# Patient Record
Sex: FEMALE | Race: WHITE | NOT HISPANIC OR LATINO | URBAN - METROPOLITAN AREA
[De-identification: names, ages, dates, MRNs, and addresses within clinical notes are randomized per-mention and may not be internally consistent; named-entity substitution may affect disease eponyms.]

---

## 2018-09-30 ENCOUNTER — INPATIENT (INPATIENT)
Facility: HOSPITAL | Age: 83
LOS: 2 days | Discharge: HOME | DRG: 312 | End: 2018-10-03
Attending: HOSPITALIST | Admitting: HOSPITALIST
Payer: MEDICARE

## 2018-09-30 VITALS
DIASTOLIC BLOOD PRESSURE: 83 MMHG | WEIGHT: 125 LBS | SYSTOLIC BLOOD PRESSURE: 142 MMHG | RESPIRATION RATE: 17 BRPM | HEIGHT: 61 IN | OXYGEN SATURATION: 97 % | HEART RATE: 90 BPM | TEMPERATURE: 98 F

## 2018-09-30 DIAGNOSIS — R42 DIZZINESS AND GIDDINESS: ICD-10-CM

## 2018-09-30 DIAGNOSIS — S52.501A UNSPECIFIED FRACTURE OF THE LOWER END OF RIGHT RADIUS, INITIAL ENCOUNTER FOR CLOSED FRACTURE: ICD-10-CM

## 2018-09-30 DIAGNOSIS — Z29.9 ENCOUNTER FOR PROPHYLACTIC MEASURES, UNSPECIFIED: ICD-10-CM

## 2018-09-30 DIAGNOSIS — R55 SYNCOPE AND COLLAPSE: ICD-10-CM

## 2018-09-30 DIAGNOSIS — S02.92XA UNSPECIFIED FRACTURE OF FACIAL BONES, INITIAL ENCOUNTER FOR CLOSED FRACTURE: ICD-10-CM

## 2018-09-30 LAB
ALBUMIN SERPL ELPH-MCNC: 3.3 G/DL — LOW (ref 3.5–5)
ALP SERPL-CCNC: 153 U/L — HIGH (ref 40–120)
ALT FLD-CCNC: 99 U/L DA — HIGH (ref 10–60)
ANION GAP SERPL CALC-SCNC: 7 MMOL/L — SIGNIFICANT CHANGE UP (ref 5–17)
APPEARANCE UR: CLEAR — SIGNIFICANT CHANGE UP
APTT BLD: 26.5 SEC — LOW (ref 27.5–37.4)
AST SERPL-CCNC: 135 U/L — HIGH (ref 10–40)
BACTERIA # UR AUTO: ABNORMAL /HPF
BASOPHILS # BLD AUTO: 0.1 K/UL — SIGNIFICANT CHANGE UP (ref 0–0.2)
BASOPHILS NFR BLD AUTO: 0.8 % — SIGNIFICANT CHANGE UP (ref 0–2)
BILIRUB SERPL-MCNC: 0.6 MG/DL — SIGNIFICANT CHANGE UP (ref 0.2–1.2)
BILIRUB UR-MCNC: NEGATIVE — SIGNIFICANT CHANGE UP
BUN SERPL-MCNC: 27 MG/DL — HIGH (ref 7–18)
CALCIUM SERPL-MCNC: 8.9 MG/DL — SIGNIFICANT CHANGE UP (ref 8.4–10.5)
CHLORIDE SERPL-SCNC: 105 MMOL/L — SIGNIFICANT CHANGE UP (ref 96–108)
CK MB BLD-MCNC: 3.1 % — SIGNIFICANT CHANGE UP (ref 0–3.5)
CK MB CFR SERPL CALC: 8.8 NG/ML — HIGH (ref 0–3.6)
CK SERPL-CCNC: 285 U/L — HIGH (ref 21–215)
CO2 SERPL-SCNC: 26 MMOL/L — SIGNIFICANT CHANGE UP (ref 22–31)
COLOR SPEC: YELLOW — SIGNIFICANT CHANGE UP
CREAT SERPL-MCNC: 0.98 MG/DL — SIGNIFICANT CHANGE UP (ref 0.5–1.3)
DIFF PNL FLD: ABNORMAL
EOSINOPHIL # BLD AUTO: 0 K/UL — SIGNIFICANT CHANGE UP (ref 0–0.5)
EOSINOPHIL NFR BLD AUTO: 0 % — SIGNIFICANT CHANGE UP (ref 0–6)
EPI CELLS # UR: ABNORMAL /HPF
GLUCOSE SERPL-MCNC: 143 MG/DL — HIGH (ref 70–99)
GLUCOSE UR QL: NEGATIVE — SIGNIFICANT CHANGE UP
HCT VFR BLD CALC: 38.1 % — SIGNIFICANT CHANGE UP (ref 34.5–45)
HGB BLD-MCNC: 12.1 G/DL — SIGNIFICANT CHANGE UP (ref 11.5–15.5)
INR BLD: 0.96 RATIO — SIGNIFICANT CHANGE UP (ref 0.88–1.16)
KETONES UR-MCNC: NEGATIVE — SIGNIFICANT CHANGE UP
LEUKOCYTE ESTERASE UR-ACNC: NEGATIVE — SIGNIFICANT CHANGE UP
LYMPHOCYTES # BLD AUTO: 0.5 K/UL — LOW (ref 1–3.3)
LYMPHOCYTES # BLD AUTO: 3.2 % — LOW (ref 13–44)
MCHC RBC-ENTMCNC: 29.1 PG — SIGNIFICANT CHANGE UP (ref 27–34)
MCHC RBC-ENTMCNC: 31.7 GM/DL — LOW (ref 32–36)
MCV RBC AUTO: 91.8 FL — SIGNIFICANT CHANGE UP (ref 80–100)
MONOCYTES # BLD AUTO: 0.7 K/UL — SIGNIFICANT CHANGE UP (ref 0–0.9)
MONOCYTES NFR BLD AUTO: 4.4 % — SIGNIFICANT CHANGE UP (ref 2–14)
NEUTROPHILS # BLD AUTO: 14.7 K/UL — HIGH (ref 1.8–7.4)
NEUTROPHILS NFR BLD AUTO: 91.6 % — HIGH (ref 43–77)
NITRITE UR-MCNC: POSITIVE
PH UR: 6 — SIGNIFICANT CHANGE UP (ref 5–8)
PLATELET # BLD AUTO: 312 K/UL — SIGNIFICANT CHANGE UP (ref 150–400)
POTASSIUM SERPL-MCNC: 4.3 MMOL/L — SIGNIFICANT CHANGE UP (ref 3.5–5.3)
POTASSIUM SERPL-SCNC: 4.3 MMOL/L — SIGNIFICANT CHANGE UP (ref 3.5–5.3)
PROT SERPL-MCNC: 6.9 G/DL — SIGNIFICANT CHANGE UP (ref 6–8.3)
PROT UR-MCNC: 15
PROTHROM AB SERPL-ACNC: 10.5 SEC — SIGNIFICANT CHANGE UP (ref 9.8–12.7)
RBC # BLD: 4.15 M/UL — SIGNIFICANT CHANGE UP (ref 3.8–5.2)
RBC # FLD: 12.9 % — SIGNIFICANT CHANGE UP (ref 10.3–14.5)
RBC CASTS # UR COMP ASSIST: ABNORMAL /HPF (ref 0–2)
SODIUM SERPL-SCNC: 138 MMOL/L — SIGNIFICANT CHANGE UP (ref 135–145)
SP GR SPEC: 1.01 — SIGNIFICANT CHANGE UP (ref 1.01–1.02)
TROPONIN I SERPL-MCNC: <0.015 NG/ML — SIGNIFICANT CHANGE UP (ref 0–0.04)
UROBILINOGEN FLD QL: NEGATIVE — SIGNIFICANT CHANGE UP
WBC # BLD: 16 K/UL — HIGH (ref 3.8–10.5)
WBC # FLD AUTO: 16 K/UL — HIGH (ref 3.8–10.5)
WBC UR QL: SIGNIFICANT CHANGE UP /HPF (ref 0–5)

## 2018-09-30 PROCEDURE — 70450 CT HEAD/BRAIN W/O DYE: CPT | Mod: 26

## 2018-09-30 PROCEDURE — 72125 CT NECK SPINE W/O DYE: CPT | Mod: 26

## 2018-09-30 PROCEDURE — 73130 X-RAY EXAM OF HAND: CPT | Mod: 26,RT

## 2018-09-30 PROCEDURE — 99285 EMERGENCY DEPT VISIT HI MDM: CPT

## 2018-09-30 PROCEDURE — 71045 X-RAY EXAM CHEST 1 VIEW: CPT | Mod: 26

## 2018-09-30 PROCEDURE — 73110 X-RAY EXAM OF WRIST: CPT | Mod: 26,RT

## 2018-09-30 PROCEDURE — 99222 1ST HOSP IP/OBS MODERATE 55: CPT | Mod: AI,GC

## 2018-09-30 PROCEDURE — 70486 CT MAXILLOFACIAL W/O DYE: CPT | Mod: 26

## 2018-09-30 RX ORDER — ACETAMINOPHEN 500 MG
650 TABLET ORAL EVERY 6 HOURS
Qty: 0 | Refills: 0 | Status: DISCONTINUED | OUTPATIENT
Start: 2018-09-30 | End: 2018-10-03

## 2018-09-30 RX ORDER — IBUPROFEN 200 MG
400 TABLET ORAL EVERY 6 HOURS
Qty: 0 | Refills: 0 | Status: DISCONTINUED | OUTPATIENT
Start: 2018-09-30 | End: 2018-10-03

## 2018-09-30 RX ORDER — SODIUM CHLORIDE 9 MG/ML
3 INJECTION INTRAMUSCULAR; INTRAVENOUS; SUBCUTANEOUS ONCE
Qty: 0 | Refills: 0 | Status: COMPLETED | OUTPATIENT
Start: 2018-09-30 | End: 2018-09-30

## 2018-09-30 RX ORDER — ENOXAPARIN SODIUM 100 MG/ML
40 INJECTION SUBCUTANEOUS DAILY
Qty: 0 | Refills: 0 | Status: DISCONTINUED | OUTPATIENT
Start: 2018-09-30 | End: 2018-10-03

## 2018-09-30 RX ADMIN — SODIUM CHLORIDE 3 MILLILITER(S): 9 INJECTION INTRAMUSCULAR; INTRAVENOUS; SUBCUTANEOUS at 08:28

## 2018-09-30 NOTE — H&P ADULT - HISTORY OF PRESENT ILLNESS
89/F form home walks with walker with no significant PMH presents after fall at home. 89/F form home walks with walker with PMH of left eye blindness presents after fall at home. This morning at 5 AM patient woke up to go to bathroom, while her way back she fell, she does not remember what happened, she briefly lost consciousness but denies any confusion or incontinence. She noted bleeding from her nose and her wrist was blue. she was not able to stand up so she crawl to sofa and called family who brought her to hospital. Denies nausea, vomiting, diarrhea, abdominal pain, SOB, chest pain, PADRON, palpitations, headache, cough, wheezing, joint pain or swelling, fever, chills.

## 2018-09-30 NOTE — H&P ADULT - NSHPPHYSICALEXAM_GEN_ALL_CORE
· Constitutional	Well-developed, well nourished  · Eyes	EOMI; PERRL; no drainage or redness  · ENMT	No oral lesions; no gross abnormalities  · Neck	No bruits; no thyromegaly or nodules   · Back	No deformity or limitation of movement   · Respiratory	Breath Sounds equal & clear to percussion & auscultation, no accessory muscle use  · Cardiovascular	Regular rate & rhythm, normal S1, S2; no murmurs, gallops or rubs; no S3, S4  · Gastrointestinal	Soft, non-tender, no hepatosplenomegaly, normal bowel sounds  · Extremities	No cyanosis, clubbing or edema   · Neurological	Alert & oriented; no sensory, motor or coordination deficits, normal reflexes  · Musculoskeletal	No joint pain, swelling or deformity; no limitation of movement · Constitutional	Well-developed, well nourished  · Eyes	periorbital swelling over right eye, left eye blindness   · ENMT	B/l bruise noted over face around cheeks and asymmetric appearance    · Neck	No bruits; no thyromegaly or nodules   · Back	No deformity or limitation of movement   · Respiratory	Breath Sounds equal & clear to percussion & auscultation, no accessory muscle use  · Cardiovascular	Regular rate & rhythm, normal S1, S2; no murmurs, gallops or rubs; no S3, S4  · Gastrointestinal	Soft, non-tender, no hepatosplenomegaly, normal bowel sounds  · Extremities	right wrist splinted, intact distal pulse and no restriction of finger movement   · Neurological	Alert & oriented; no sensory, motor or coordination deficits, normal reflexes  · Musculoskeletal	No joint pain, swelling or deformity; no limitation of movement

## 2018-09-30 NOTE — H&P ADULT - ATTENDING COMMENTS
Patient was examined in the ED with Dr. Mar.  She sustained a fall at home and believes she briefly lost consciousness.  She lives alone, has children and grandchildren.   PMD was Dr. Graham.  She has not seen him recently; she states he no longer takes her insurance.     Alert cooperative 89 yo woman with facial bruises below the orbits and in the maxillary area.  Vital Signs Last 24 Hrs  T(C): 36.7 (30 Sep 2018 19:56), Max: 36.7 (30 Sep 2018 16:12)  T(F): 98 (30 Sep 2018 19:56), Max: 98 (30 Sep 2018 16:12)  HR: 97 (30 Sep 2018 19:56) (90 - 99)  BP: 129/65 (30 Sep 2018 19:56) (129/65 - 142/83)  BP(mean): --  RR: 18 (30 Sep 2018 19:56) (17 - 18)  SpO2: 98% (30 Sep 2018 19:56) (97% - 98%)  Lungs, clear  Cor, no murmur  Neurological, intact                       12.1   16.0  )-----------( 312      ( 30 Sep 2018 08:30 )             38.1   09-30    138  |  105  |  27<H>  ----------------------------<  143<H>  4.3   |  26  |  0.98    Ca    8.9      30 Sep 2018 08:30    TPro  6.9  /  Alb  3.3<L>  /  TBili  0.6  /  DBili  x   /  AST  135<H>  /  ALT  99<H>  /  AlkPhos  153<H>  0930        Urinalysis Basic - ( 30 Sep 2018 11:02 )    Color: Yellow / Appearance: Clear / S.010 / pH: x  Gluc: x / Ketone: Negative  / Bili: Negative / Urobili: Negative   Blood: x / Protein: 15 / Nitrite: Positive   Leuk Esterase: Negative / RBC: 2-5 /HPF / WBC 0-2 /HPF   Sq Epi: x / Non Sq Epi: Moderate /HPF / Bacteria: Many /HPF  PT/INR - ( 30 Sep 2018 08:30 )   PT: 10.5 sec;   INR: 0.96 ratio      PTT - ( 30 Sep 2018 08:30 )  PTT:26.5 sec    Lactate Trend    CARDIAC MARKERS ( 30 Sep 2018 08:30 )  <0.015 ng/mL / x     / 285 U/L / x     / 8.8 ng/mL  POCT Blood Glucose.: 141 mg/dL (30 Sep 2018 07:55)    IMP:  Syncope, resulting in facial trauma.  R/o ACS, arrhythmia.  PE is in the ddx, but less likely.           Patient lives alone in her home and may need some additional help.   Plan: Tele/troponin/echo/cardiology          SW consultation.  ENT has been called by ED for facial trauma.

## 2018-09-30 NOTE — H&P ADULT - PROBLEM SELECTOR PLAN 4
IMPROVE VTE Individual Risk Assessment          RISK                                                          Points  [  ] Previous VTE                                                3  [  ] Thrombophilia                                             2  [  ] Lower limb paralysis                                   2        (unable to hold up >15 seconds)    [  ] Current Cancer                                             2         (within 6 months)  [ x ] Immobilization > 24 hrs                              1  [  ] ICU/CCU stay > 24 hours                             1  [ x ] Age > 60                                                         1    IMPROVE VTE Score: 2  will start on lovenox

## 2018-09-30 NOTE — H&P ADULT - NSHPLABSRESULTS_GEN_ALL_CORE
Vital Signs Last 24 Hrs  T(C): 36.2 (30 Sep 2018 10:35), Max: 36.6 (30 Sep 2018 07:47)  T(F): 97.2 (30 Sep 2018 10:35), Max: 97.8 (30 Sep 2018 07:47)  HR: 94 (30 Sep 2018 10:35) (90 - 94)  BP: 136/73 (30 Sep 2018 10:35) (136/73 - 142/83)  RR: 18 (30 Sep 2018 10:35) (17 - 18)  SpO2: 97% (30 Sep 2018 10:35) (97% - 97%)                          12.1   16.0  )-----------( 312      ( 30 Sep 2018 08:30 )             38.1         138  |  105  |  27<H>  ----------------------------<  143<H>  4.3   |  26  |  0.98    Ca    8.9      30 Sep 2018 08:30    TPro  6.9  /  Alb  3.3<L>  /  TBili  0.6  /  DBili  x   /  AST  135<H>  /  ALT  99<H>  /  AlkPhos  153<H>      Urinalysis Basic - ( 30 Sep 2018 11:02 )    Color: Yellow / Appearance: Clear / S.010 / pH: x  Gluc: x / Ketone: Negative  / Bili: Negative / Urobili: Negative   Blood: x / Protein: 15 / Nitrite: Positive   Leuk Esterase: Negative / RBC: 2-5 /HPF / WBC 0-2 /HPF   Sq Epi: x / Non Sq Epi: Moderate /HPF / Bacteria: Many /HPF    PT/INR - ( 30 Sep 2018 08:30 )   PT: 10.5 sec;   INR: 0.96 ratio         PTT - ( 30 Sep 2018 08:30 )  PTT:26.5 sec    CARDIAC MARKERS ( 30 Sep 2018 08:30 )  <0.015 ng/mL / x     / 285 U/L / x     / 8.8 ng/mL      CAPILLARY BLOOD GLUCOSE    POCT Blood Glucose.: 141 mg/dL (30 Sep 2018 07:55)    < from: Xray Hand 3 Views, Right (18 @ 09:32) >    Impression:    Old injury deformity, however there is suspicion of a new distal radial   head fracture. This would be better evaluated with CT as clinically   indicated.    < from: Xray Chest 1 View AP/PA (18 @ 09:32) >    Comparison: None.     The mediastinal cardiac silhouette is unremarkable.    The lungs are clear.     Severe deformity of the right shoulder only partially imaged. This   appears chronic. Correlate clinically for pain at this site. No definite   acute osseous injury is seen.    < from: Xray Wrist 3 Views, Right (18 @ 09:31) >    Impression:    Old injury deformity, however there is suspicion of a new distal radial   head fracture. This would be better evaluated with CT as clinically   indicated.      < end of copied text >    < from: CT Maxillofacial No Cont (18 @ 09:29) >    IMPRESSION:  CT head: No acute intracranial hemorrhage ormass effect.    CT cervical spine: No CT evidence of cervical spine fracture.    CT facial bones:  -Extensive bilateral facial bone fractures:  -Right orbit superomedially and of the right orbital floor, involving the   right infraorbital foramen foramen  -Left inferior orbital flopr  -Comminuted fractures of nearly every wall of the maxillary sinuses; open   fracture lines with communication between the oral cavity and the   maxillary sinuses are seen bilaterally.  -Mildly displaced fracture of the bony nasal septum posteriorly, there it   appears to be contained within the membranous nasal septum.  -Bilateral nasal bone and bilateral frontal recesses of the maxilla   fractures.

## 2018-09-30 NOTE — ED ADULT NURSE NOTE - NSIMPLEMENTINTERV_GEN_ALL_ED
Implemented All Fall with Harm Risk Interventions:  [place] to call system. Call bell, personal items and telephone within reach. Instruct patient to call for assistance. Room bathroom lighting operational. Non-slip footwear when patient is off stretcher. Physically safe environment: no spills, clutter or unnecessary equipment. Stretcher in lowest position, wheels locked, appropriate side rails in place. Provide visual cue, wrist band, yellow gown, etc. Monitor gait and stability. Monitor for mental status changes and reorient to person, place, and time. Review medications for side effects contributing to fall risk. Reinforce activity limits and safety measures with patient and family. Provide visual clues: red socks.

## 2018-09-30 NOTE — ED ADULT NURSE NOTE - OBJECTIVE STATEMENT
pt trip and fall pt hit the head bruises and swelling in the face . pt has the hematoma to the rt wrist

## 2018-09-30 NOTE — ED PROVIDER NOTE - CARE PLAN
Principal Discharge DX:	Dizziness  Secondary Diagnosis:	Facial fracture  Secondary Diagnosis:	Distal radius fracture, right

## 2018-09-30 NOTE — ED ADULT NURSE NOTE - CHIEF COMPLAINT
Gissel was seen in clinic for a weight check.  Mother also had concerns about the last 3 appt charges. Mother states that they were billed as mental health appt's and are fairly costly. Mother wondering if they could be billed differently?    Wt Readings from Last 5 Encounters:   02/06/17 78 lb 6.4 oz (35.562 kg) (3.94 %*)   01/23/17 79 lb 3.2 oz (35.925 kg) (4.80 %*)   01/16/17 78 lb (35.381 kg) (3.95 %*)   01/02/17 78 lb 6.4 oz (35.562 kg) (4.49 %*)   12/27/16 76 lb (34.473 kg) (2.86 %*)     * Growth percentiles are based on CDC 2-20 Years data.     Routing to Dr. Lebron for review.   Tran Gonzalez RN    
The patient is a 89y Female complaining of fall.

## 2018-09-30 NOTE — ED PROVIDER NOTE - MEDICAL DECISION MAKING DETAILS
89 y/o F pt s/p fall w/ facial and wrist injury, will check CT of face, X-ray of wrist, check labs and reassess

## 2018-09-30 NOTE — ED PROVIDER NOTE - OBJECTIVE STATEMENT
89 y/o F pt w/ no PMHx presents to ED s/p at 5am today. Pt was ambulating with her walker when she lost her balance and fell on to her right hand. Pt crawled to the sofa and called her family who helped her report to ED. Pt is c/o swelling to face and right wrist. Pt denies loss of consciousness, shortness of breath, weakness and all other complaints. Pt lives at home alone, is not currently on any medications. NKDA

## 2018-09-30 NOTE — H&P ADULT - PROBLEM SELECTOR PLAN 1
unfitness fall with brief loss of consciousness  denies any confusion or incontinence after fall   likely vasovagal as right after BM   orthostatic negative   follow ECHO and CD   T1 negative follow T2 and T3   EKG NSR with no STT wave changes unfitness fall with brief loss of consciousness  denies any confusion or incontinence after fall   likely vasovagal as right after BM   orthostatic negative   follow ECHO and CD   T1 negative follow T2 and T3   EKG NSR with no STT wave changes  Cardio Dr. Landeros

## 2018-09-30 NOTE — ED PROVIDER NOTE - LEFT FACE
swelling and periorbital ecchymosis to face, right greater than left, no point tenderness to palpation, no septal hematoma

## 2018-09-30 NOTE — H&P ADULT - PROBLEM SELECTOR PLAN 2
multiple fractures noted over fascial bone and orbital floor, sinuses   - ENT Dr. Bullock was consulted by ED - conservative management.  denies pain at this time   pain control with advil and tylenol

## 2018-09-30 NOTE — H&P ADULT - PROBLEM SELECTOR PROBLEM 4
Dr Pierce  Pt called in. He did an evisit with you on 1/16/18 and was dx'd with influenza. He reported wheezing at the time and is still having intermittent wheezing. Ultimately he is better. He ski 'ed 7 miles the last two days. His energy is up, denies fever, chills or sweats. Could we send an inhaler in for him? He denies h/o asthma.  I zoila'd up inhaler and pharmacy.  Or do you want to see him? He was up for either.    Thanks!     Tere Chacon RN     Need for prophylactic measure

## 2018-09-30 NOTE — H&P ADULT - ASSESSMENT
In ED:   Vitals: 136/73, 94, 97.2, 18(97)  Labs: WBC of 16K   AST/ALT: 135/99 Alkaline phosphate: 153  UA: negative   CT head and cervical spine: no acute pathology needed.  CT facial bone: multiple fractures - ENT Dr. Bullock was consulted - conservative management.   Xray hand: Old injury deformity, however there is suspicion of a new distal radial right head fracture. 89/F form home walks with walker with PMH of left eye blindness presents after fall at home. This morning at 5 AM patient woke up to go to bathroom, while her way back she fell, she does not remember what happened, she briefly lost consciousness but denies any confusion or incontinence. She noted bleeding from her nose and her wrist was blue. she was not able to stand up so she crawl to sofa and called family who brought her to hospital. Denies nausea, vomiting, diarrhea, abdominal pain, SOB, chest pain, PADRON, palpitations, headache, cough, wheezing, joint pain or swelling, fever, chills.     In ED:   Vitals: 136/73, 94, 97.2, 18(97)  Labs: WBC of 16K   AST/ALT: 135/99 Alkaline phosphate: 153  EKG: NSR with no STT wave changes   UA: negative   CT head and cervical spine: no acute pathology needed.  CT facial bone: multiple fractures - ENT Dr. Bullock was consulted - conservative management.   Xray hand: Old injury deformity, however there is suspicion of a new distal radial right head fracture.

## 2018-10-01 DIAGNOSIS — G89.11 ACUTE PAIN DUE TO TRAUMA: ICD-10-CM

## 2018-10-01 LAB
24R-OH-CALCIDIOL SERPL-MCNC: 38.7 NG/ML — SIGNIFICANT CHANGE UP (ref 30–80)
ALBUMIN SERPL ELPH-MCNC: 2.8 G/DL — LOW (ref 3.5–5)
ALP SERPL-CCNC: 145 U/L — HIGH (ref 40–120)
ALT FLD-CCNC: 98 U/L DA — HIGH (ref 10–60)
ANION GAP SERPL CALC-SCNC: 6 MMOL/L — SIGNIFICANT CHANGE UP (ref 5–17)
AST SERPL-CCNC: 88 U/L — HIGH (ref 10–40)
BASOPHILS # BLD AUTO: 0 K/UL — SIGNIFICANT CHANGE UP (ref 0–0.2)
BASOPHILS NFR BLD AUTO: 0.3 % — SIGNIFICANT CHANGE UP (ref 0–2)
BILIRUB SERPL-MCNC: 0.5 MG/DL — SIGNIFICANT CHANGE UP (ref 0.2–1.2)
BUN SERPL-MCNC: 25 MG/DL — HIGH (ref 7–18)
CALCIUM SERPL-MCNC: 9.1 MG/DL — SIGNIFICANT CHANGE UP (ref 8.4–10.5)
CHLORIDE SERPL-SCNC: 104 MMOL/L — SIGNIFICANT CHANGE UP (ref 96–108)
CHOLEST SERPL-MCNC: 134 MG/DL — SIGNIFICANT CHANGE UP (ref 10–199)
CK MB BLD-MCNC: 0.8 % — SIGNIFICANT CHANGE UP (ref 0–3.5)
CK MB CFR SERPL CALC: 3.3 NG/ML — SIGNIFICANT CHANGE UP (ref 0–3.6)
CK SERPL-CCNC: 421 U/L — HIGH (ref 21–215)
CO2 SERPL-SCNC: 29 MMOL/L — SIGNIFICANT CHANGE UP (ref 22–31)
CREAT SERPL-MCNC: 0.82 MG/DL — SIGNIFICANT CHANGE UP (ref 0.5–1.3)
EOSINOPHIL # BLD AUTO: 0 K/UL — SIGNIFICANT CHANGE UP (ref 0–0.5)
EOSINOPHIL NFR BLD AUTO: 0.3 % — SIGNIFICANT CHANGE UP (ref 0–6)
FOLATE SERPL-MCNC: >20 NG/ML — SIGNIFICANT CHANGE UP
GLUCOSE SERPL-MCNC: 95 MG/DL — SIGNIFICANT CHANGE UP (ref 70–99)
HBA1C BLD-MCNC: 5.4 % — SIGNIFICANT CHANGE UP (ref 4–5.6)
HCT VFR BLD CALC: 36.4 % — SIGNIFICANT CHANGE UP (ref 34.5–45)
HDLC SERPL-MCNC: 63 MG/DL — SIGNIFICANT CHANGE UP
HGB BLD-MCNC: 11.7 G/DL — SIGNIFICANT CHANGE UP (ref 11.5–15.5)
LIPID PNL WITH DIRECT LDL SERPL: 59 MG/DL — SIGNIFICANT CHANGE UP
LYMPHOCYTES # BLD AUTO: 0.6 K/UL — LOW (ref 1–3.3)
LYMPHOCYTES # BLD AUTO: 9.6 % — LOW (ref 13–44)
MAGNESIUM SERPL-MCNC: 2.1 MG/DL — SIGNIFICANT CHANGE UP (ref 1.6–2.6)
MCHC RBC-ENTMCNC: 29.1 PG — SIGNIFICANT CHANGE UP (ref 27–34)
MCHC RBC-ENTMCNC: 32.1 GM/DL — SIGNIFICANT CHANGE UP (ref 32–36)
MCV RBC AUTO: 90.7 FL — SIGNIFICANT CHANGE UP (ref 80–100)
MONOCYTES # BLD AUTO: 0.3 K/UL — SIGNIFICANT CHANGE UP (ref 0–0.9)
MONOCYTES NFR BLD AUTO: 4.8 % — SIGNIFICANT CHANGE UP (ref 2–14)
NEUTROPHILS # BLD AUTO: 5.6 K/UL — SIGNIFICANT CHANGE UP (ref 1.8–7.4)
NEUTROPHILS NFR BLD AUTO: 85 % — HIGH (ref 43–77)
PHOSPHATE SERPL-MCNC: 2.8 MG/DL — SIGNIFICANT CHANGE UP (ref 2.5–4.5)
PLATELET # BLD AUTO: 261 K/UL — SIGNIFICANT CHANGE UP (ref 150–400)
POTASSIUM SERPL-MCNC: 4.3 MMOL/L — SIGNIFICANT CHANGE UP (ref 3.5–5.3)
POTASSIUM SERPL-SCNC: 4.3 MMOL/L — SIGNIFICANT CHANGE UP (ref 3.5–5.3)
PROT SERPL-MCNC: 6.5 G/DL — SIGNIFICANT CHANGE UP (ref 6–8.3)
RBC # BLD: 4.02 M/UL — SIGNIFICANT CHANGE UP (ref 3.8–5.2)
RBC # FLD: 12.8 % — SIGNIFICANT CHANGE UP (ref 10.3–14.5)
SODIUM SERPL-SCNC: 139 MMOL/L — SIGNIFICANT CHANGE UP (ref 135–145)
TOTAL CHOLESTEROL/HDL RATIO MEASUREMENT: 2.1 RATIO — LOW (ref 3.3–7.1)
TRIGL SERPL-MCNC: 60 MG/DL — SIGNIFICANT CHANGE UP (ref 10–149)
TROPONIN I SERPL-MCNC: <0.015 NG/ML — SIGNIFICANT CHANGE UP (ref 0–0.04)
TSH SERPL-MCNC: 0.57 UU/ML — SIGNIFICANT CHANGE UP (ref 0.34–4.82)
VIT B12 SERPL-MCNC: 1296 PG/ML — HIGH (ref 232–1245)
WBC # BLD: 6.6 K/UL — SIGNIFICANT CHANGE UP (ref 3.8–10.5)
WBC # FLD AUTO: 6.6 K/UL — SIGNIFICANT CHANGE UP (ref 3.8–10.5)

## 2018-10-01 PROCEDURE — 93880 EXTRACRANIAL BILAT STUDY: CPT | Mod: 26

## 2018-10-01 PROCEDURE — 99233 SBSQ HOSP IP/OBS HIGH 50: CPT | Mod: GC

## 2018-10-01 RX ORDER — HALOPERIDOL DECANOATE 100 MG/ML
1 INJECTION INTRAMUSCULAR ONCE
Qty: 0 | Refills: 0 | Status: COMPLETED | OUTPATIENT
Start: 2018-10-01 | End: 2018-10-01

## 2018-10-01 RX ADMIN — ENOXAPARIN SODIUM 40 MILLIGRAM(S): 100 INJECTION SUBCUTANEOUS at 16:37

## 2018-10-01 RX ADMIN — HALOPERIDOL DECANOATE 1 MILLIGRAM(S): 100 INJECTION INTRAMUSCULAR at 23:03

## 2018-10-01 NOTE — PROGRESS NOTE ADULT - PROBLEM SELECTOR PLAN 2
- Noted with multiple fractures  over fascial bone and orbital floor, sinuses   - Pain control.  - supportive measures

## 2018-10-01 NOTE — CONSULT NOTE ADULT - SUBJECTIVE AND OBJECTIVE BOX
Pt Name: KATIA JERNIGAN  MRN: [id number]    ORTHOPEDIC CONSULT:    Orthopedic diagnosis: Right distal radius fracture    89yFemaleHPI:  89/F form home walks with walker with PMH of left eye blindness presents after fall at home. This morning at 5 AM patient woke up to go to bathroom, while her way back she fell, she does not remember what happened, she briefly lost consciousness but denies any confusion or incontinence. She noted bleeding from her nose and her wrist was blue. she was not able to stand up so she crawl to sofa and called family who brought her to hospital. Denies nausea, vomiting, diarrhea, abdominal pain, SOB, chest pain, PADRON, palpitations, headache, cough, wheezing, joint pain or swelling, fever, chills. (30 Sep 2018 15:41)    89 y/o F from home who presents s/p fall 1 day ago at 5am. Patient states she woke up to use the bathroom, then went into the kitchen and sustained a fall landing onto her right wrist and hitting her head with brief LOC. She states that she crawled to the sofa and called her daughter and was brought to the hospital by EMS. Patient states that she does not have any wrist pain currently. Pt denies Chest pain, SOB, dyspnea, paresthesias, N/V/D, abdominal pain, or pain anywhere else.     AMBULATION: Baseline Ambulation [ X ] With Walker    PAST MEDICAL & SURGICAL HISTORY:  No pertinent past medical history  No significant past surgical history      ALLERGIES: No Known Allergies    MEDICATIONS: acetaminophen   Tablet .. 650 milliGRAM(s) Oral every 6 hours PRN  enoxaparin Injectable 40 milliGRAM(s) SubCutaneous daily  ibuprofen  Tablet. 400 milliGRAM(s) Oral every 6 hours PRN    PHYSICAL EXAM:    Vital Signs Last 24 Hrs  T(C): 36.5 (01 Oct 2018 10:56), Max: 36.8 (30 Sep 2018 23:38)  T(F): 97.7 (01 Oct 2018 10:56), Max: 98.2 (30 Sep 2018 23:38)  HR: 90 (01 Oct 2018 10:56) (78 - 99)  BP: 155/64 (01 Oct 2018 10:56) (129/65 - 155/64)  BP(mean): --  RR: 16 (01 Oct 2018 10:56) (16 - 18)  SpO2: 100% (01 Oct 2018 10:56) (97% - 100%)    Gen: well developed, well nourished, comfortable  Extremities: no clubbing/cyanosis, no edema, no calf tenderness  Neurological: no focal deficits  Skin: no rash on visible skin  Musculoskeletal:    RUE: Skin warm and pink. Splint intact. Full ROM all digits. SILT. NVI. Cap refill <2seconds. Compartments soft.    LABS:                        11.7   6.6   )-----------( 261      ( 01 Oct 2018 05:46 )             36.4     10-01    139  |  104  |  25<H>  ----------------------------<  95  4.3   |  29  |  0.82    Ca    9.1      01 Oct 2018 05:46  Phos  2.8     10-01  Mg     2.1     10-01    TPro  6.5  /  Alb  2.8<L>  /  TBili  0.5  /  DBili  x   /  AST  88<H>  /  ALT  98<H>  /  AlkPhos  145<H>  10-01    PT/INR - ( 30 Sep 2018 08:30 )   PT: 10.5 sec;   INR: 0.96 ratio         PTT - ( 30 Sep 2018 08:30 )  PTT:26.5 sec    RADIOLOGY:   EXAM:  HAND RIGHT (MINIMUM 3 VIEWS)                        EXAM:  WRIST RIGHT (MINIMUM 3 V)                          PROCEDURE DATE:  09/30/2018      INTERPRETATION:  Clinical information: Right hand and wrist pain   following fall.    AP, lateral, and oblique views of the right wrist and right hand were   obtained. There is no comparison examination.    There is diffuse osteopenia which limits evaluation. There are old healed   fractures of the distal right ulnar and radius, however there is a small   step off and lucency at the distal radius which may be acute. There is   associated soft tissue swelling. Correlate clinically for pain at this   site. This would be better evaluated with cross-sectional CT examination.   There are vascular calcifications. There are scattered degenerative   changes.    Impression:    Old injury deformity, however there is suspicion of a new distal radial   head fracture. This would be better evaluated with CT as clinically   indicated.    IMPRESSION: Pt is a  89y Female with right distal radius fracture    PLAN:  -  Recommendation: [ X ] Conservative treatment  --> X-rays reviewed and discussed with Dr. Davis, who recommends conservative management.  -  Patient is to remain in sugar tong splint until she follows up with Dr. Davis as outpatient  -  Pain control  -  DVT prophylaxis  -  Fracture care, NWB to RUE  -  Pt is orthopedically stable   -  Pt is to follow up with Dr. Davis within ONE week in office at: #[phone]  -  Case d/w Dr. Davis Pt Name: KATIA JERNIGAN  MRN: [id number]    ORTHOPEDIC CONSULT:    Orthopedic diagnosis: Right distal radius fracture    89yFemaleHPI:  89/F form home walks with walker with PMH of left eye blindness presents after fall at home. This morning at 5 AM patient woke up to go to bathroom, while her way back she fell, she does not remember what happened, she briefly lost consciousness but denies any confusion or incontinence. She noted bleeding from her nose and her wrist was blue. she was not able to stand up so she crawl to sofa and called family who brought her to hospital. Denies nausea, vomiting, diarrhea, abdominal pain, SOB, chest pain, PADRON, palpitations, headache, cough, wheezing, joint pain or swelling, fever, chills. (30 Sep 2018 15:41)    89 y/o F from home who presents s/p fall 1 day ago at 5am. Patient states she woke up to use the bathroom, then went into the kitchen and sustained a fall landing onto her right wrist and hitting her head. She states that she crawled to the sofa and called her daughter and was brought to the hospital by EMS. Patient states that she does not have any wrist pain currently. Pt denies Chest pain, SOB, dyspnea, paresthesias, N/V/D, abdominal pain, or pain anywhere else.     AMBULATION: Baseline Ambulation [ X ] With Walker    PAST MEDICAL & SURGICAL HISTORY:  No pertinent past medical history  No significant past surgical history      ALLERGIES: No Known Allergies    MEDICATIONS: acetaminophen   Tablet .. 650 milliGRAM(s) Oral every 6 hours PRN  enoxaparin Injectable 40 milliGRAM(s) SubCutaneous daily  ibuprofen  Tablet. 400 milliGRAM(s) Oral every 6 hours PRN    PHYSICAL EXAM:    Vital Signs Last 24 Hrs  T(C): 36.5 (01 Oct 2018 10:56), Max: 36.8 (30 Sep 2018 23:38)  T(F): 97.7 (01 Oct 2018 10:56), Max: 98.2 (30 Sep 2018 23:38)  HR: 90 (01 Oct 2018 10:56) (78 - 99)  BP: 155/64 (01 Oct 2018 10:56) (129/65 - 155/64)  BP(mean): --  RR: 16 (01 Oct 2018 10:56) (16 - 18)  SpO2: 100% (01 Oct 2018 10:56) (97% - 100%)    Gen: well developed, well nourished, comfortable  Extremities: no clubbing/cyanosis, no edema, no calf tenderness  Neurological: no focal deficits  Skin: no rash on visible skin  Musculoskeletal:    RUE: Skin warm and pink. Splint intact. Full ROM all digits. SILT. NVI. Cap refill <2seconds. Compartments soft.    LABS:                        11.7   6.6   )-----------( 261      ( 01 Oct 2018 05:46 )             36.4     10-01    139  |  104  |  25<H>  ----------------------------<  95  4.3   |  29  |  0.82    Ca    9.1      01 Oct 2018 05:46  Phos  2.8     10-01  Mg     2.1     10-01    TPro  6.5  /  Alb  2.8<L>  /  TBili  0.5  /  DBili  x   /  AST  88<H>  /  ALT  98<H>  /  AlkPhos  145<H>  10-01    PT/INR - ( 30 Sep 2018 08:30 )   PT: 10.5 sec;   INR: 0.96 ratio         PTT - ( 30 Sep 2018 08:30 )  PTT:26.5 sec    RADIOLOGY:   EXAM:  HAND RIGHT (MINIMUM 3 VIEWS)                        EXAM:  WRIST RIGHT (MINIMUM 3 V)                          PROCEDURE DATE:  09/30/2018      INTERPRETATION:  Clinical information: Right hand and wrist pain   following fall.    AP, lateral, and oblique views of the right wrist and right hand were   obtained. There is no comparison examination.    There is diffuse osteopenia which limits evaluation. There are old healed   fractures of the distal right ulnar and radius, however there is a small   step off and lucency at the distal radius which may be acute. There is   associated soft tissue swelling. Correlate clinically for pain at this   site. This would be better evaluated with cross-sectional CT examination.   There are vascular calcifications. There are scattered degenerative   changes.    Impression:    Old injury deformity, however there is suspicion of a new distal radial   head fracture. This would be better evaluated with CT as clinically   indicated.    IMPRESSION: Pt is a  89y Female with right distal radius fracture    PLAN:  -  Recommendation: [ X ] Conservative treatment  --> X-rays reviewed and discussed with Dr. Davis, who recommends conservative management.  -  Patient is to remain in sugar tong splint until she follows up with Dr. Davis as outpatient  -  Pain control  -  DVT prophylaxis  -  Fracture care, NWB to RUE  -  Pt is orthopedically stable   -  Pt is to follow up with Dr. Davis within ONE week in office at: #[phone]  -  Case d/w Dr. Davis

## 2018-10-01 NOTE — CONSULT NOTE ADULT - ATTENDING COMMENTS
Thank you for the courtesy of the consultation,I would be available for any further discussion if needed.  Gary Landeros MD,FACC.  [address] [place], [place] [id number] [phone]

## 2018-10-01 NOTE — CONSULT NOTE ADULT - SUBJECTIVE AND OBJECTIVE BOX
Patient is a 89y old  Female who presents with a chief complaint of fall (01 Oct 2018 12:50)  CT showed Extensive bilateral facial bone fractures including right orbit, left inferior orbital floor, fractures of nearly every wall of the maxillary sinuses,   bilateral nasal bone and bilateral frontal recesses of the maxilla   Dr. Bullock ENT recommended conservative management.   Xray distal radial right head fracture. Seen by ortho, sugar tongue splint in place.   No signs of arrhythmia on telemetry monitoring   Pt seen at bedside, NAD, appears comfortable, alert, awake.   Pt denies chest pain, headache, vomiting, right arm/hand numbness, fever.       MEDICATIONS  (STANDING):  enoxaparin Injectable 40 milliGRAM(s) SubCutaneous daily    MEDICATIONS  (PRN):  acetaminophen   Tablet .. 650 milliGRAM(s) Oral every 6 hours PRN Temp greater or equal to 38C (100.4F), Mild Pain (1 - 3)  ibuprofen  Tablet. 400 milliGRAM(s) Oral every 6 hours PRN Moderate Pain (4 - 6)      __________________________________________________  REVIEW OF SYSTEMS:    CONSTITUTIONAL: No fever,   EYES: no acute visual disturbances  NECK: No pain or stiffness  RESPIRATORY: No cough; No shortness of breath  CARDIOVASCULAR: No chest pain, no palpitations  GASTROINTESTINAL: No pain. No nausea or vomiting; No diarrhea   NEUROLOGICAL: No headache or numbness, no tremors  MUSCULOSKELETAL:  + facial soreness, + right forearm discomfort   GENITOURINARY: no dysuria, no frequency, no hesitancy  PSYCHIATRY: no depression , no anxiety  ALL OTHER  ROS negative        Vital Signs Last 24 Hrs  T(C): 36.5 (01 Oct 2018 10:56), Max: 36.8 (30 Sep 2018 23:38)  T(F): 97.7 (01 Oct 2018 10:56), Max: 98.2 (30 Sep 2018 23:38)  HR: 90 (01 Oct 2018 10:56) (78 - 99)  BP: 155/64 (01 Oct 2018 10:56) (129/65 - 155/64)  BP(mean): --  RR: 16 (01 Oct 2018 10:56) (16 - 18)  SpO2: 100% (01 Oct 2018 10:56) (97% - 100%)    ________________________________________________  PHYSICAL EXAM:  GENERAL: NAD  HEENT: Normocephalic;  conjunctivae and sclerae clear; moist mucous membranes;   NECK : supple  CHEST/LUNG: Clear to ausculitation bilaterally with good air entry   HEART: S1 S2  regular; no murmurs, gallops or rubs  ABDOMEN: Soft, Nontender, Nondistended; Bowel sounds present  EXTREMITIES: no cyanosis; no edema; no calf tenderness, + right forearm in splint, intact, +fingers warm to touch, mobile   SKIN: warm and dry; no rash  NERVOUS SYSTEM:  Awake and alert; Oriented  to place, person and time ; no new deficits    _________________________________________________  LABS:                        11.7   6.6   )-----------( 261      ( 01 Oct 2018 05:46 )             36.4     10-    139  |  104  |  25<H>  ----------------------------<  95  4.3   |  29  |  0.82    Ca    9.1      01 Oct 2018 05:46  Phos  2.8     10-  Mg     2.1     10-    TPro  6.5  /  Alb  2.8<L>  /  TBili  0.5  /  DBili  x   /  AST  88<H>  /  ALT  98<H>  /  AlkPhos  145<H>  10-01    PT/INR - ( 30 Sep 2018 08:30 )   PT: 10.5 sec;   INR: 0.96 ratio         PTT - ( 30 Sep 2018 08:30 )  PTT:26.5 sec  Urinalysis Basic - ( 30 Sep 2018 11:02 )    Color: Yellow / Appearance: Clear / S.010 / pH: x  Gluc: x / Ketone: Negative  / Bili: Negative / Urobili: Negative   Blood: x / Protein: 15 / Nitrite: Positive   Leuk Esterase: Negative / RBC: 2-5 /HPF / WBC 0-2 /HPF   Sq Epi: x / Non Sq Epi: Moderate /HPF / Bacteria: Many /HPF      CAPILLARY BLOOD GLUCOSE            RADIOLOGY & ADDITIONAL TESTS:    Imaging Personally Reviewed:  YES/NO    Consultant(s) Notes Reviewed:   YES/ No    Care Discussed with Consultants :     Plan of care was discussed with patient and /or primary care giver; all questions and concerns were addressed and care was aligned with patient's wishes.

## 2018-10-01 NOTE — PROGRESS NOTE ADULT - PROBLEM SELECTOR PLAN 1
likely vasovagal as right after BM, orthostatic negative Troponin negative  no signs of arrythmia on telemetry   - ECHO done pending report   - Cardio Dr. Landeros.

## 2018-10-01 NOTE — PROGRESS NOTE ADULT - ASSESSMENT
89/F form home walks with walker with PMH of left eye blindness presents after fall at home.    Admission EKG: NSR with no acute ST-T wave changes   CT facial bone: multiple fractures --> conservative management.   Xray hand: Old injury deformity, however there is suspicion of a new distal radial right head fracture.

## 2018-10-01 NOTE — PROGRESS NOTE ADULT - PROBLEM SELECTOR PLAN 1
unfitness fall with brief loss of consciousness  denies any confusion or incontinence after fall   likely vasovagal as right after BM   orthostatic negative   follow ECHO and CD   T1 negative follow T2 and T3   EKG NSR with no STT wave changes  Cardio Dr. Landeros

## 2018-10-01 NOTE — PROGRESS NOTE ADULT - SUBJECTIVE AND OBJECTIVE BOX
MEDICAL ATTENDING NOTE- LATE ENTRY NOTE. Patient was seen earlier today    Patient is a 89y old  Female who presents with a chief complaint of fall (01 Oct 2018 13:42)      INTERVAL HPI/OVERNIGHT EVENTS: no new complaints    MEDICATIONS  (STANDING):  enoxaparin Injectable 40 milliGRAM(s) SubCutaneous daily    MEDICATIONS  (PRN):  acetaminophen   Tablet .. 650 milliGRAM(s) Oral every 6 hours PRN Temp greater or equal to 38C (100.4F), Mild Pain (1 - 3)  ibuprofen  Tablet. 400 milliGRAM(s) Oral every 6 hours PRN Moderate Pain (4 - 6)      __________________________________________________  ----------------------------------------------------------------------------------  REVIEW OF SYSTEMS: no fever, no SOB, No Chest pain; feels ok      Vital Signs Last 24 Hrs  T(C): 36.9 (01 Oct 2018 20:22), Max: 37.3 (01 Oct 2018 15:30)  T(F): 98.4 (01 Oct 2018 20:22), Max: 99.1 (01 Oct 2018 15:30)  HR: 95 (01 Oct 2018 20:22) (78 - 108)  BP: 127/79 (01 Oct 2018 20:22) (121/72 - 155/64)  BP(mean): --  RR: 17 (01 Oct 2018 20:22) (16 - 19)  SpO2: 96% (01 Oct 2018 20:22) (96% - 100%)    _________________  PHYSICAL EXAM:  ---------------------------   NAD; Normocephalic;   LUNGS - no wheezing  HEART: S1 S2+   ABDOMEN: Soft, Nontender, non distended; BS+  EXTREMITIES: no cyanosis; no edema  NERVOUS SYSTEM:  Awake and alert; no new deficits  SKIN- Multiple el of ecchymoses on face  MSK: right upper extremity splint    _________________________________________________  LABS:                        11.7   6.6   )-----------( 261      ( 01 Oct 2018 05:46 )             36.4     10    139  |  104  |  25<H>  ----------------------------<  95  4.3   |  29  |  0.82    Ca    9.1      01 Oct 2018 05:46  Phos  2.8     10  Mg     2.1     10-01    TPro  6.5  /  Alb  2.8<L>  /  TBili  0.5  /  DBili  x   /  AST  88<H>  /  ALT  98<H>  /  AlkPhos  145<H>  10    PT/INR - ( 30 Sep 2018 08:30 )   PT: 10.5 sec;   INR: 0.96 ratio         PTT - ( 30 Sep 2018 08:30 )  PTT:26.5 sec  Urinalysis Basic - ( 30 Sep 2018 11:02 )    Color: Yellow / Appearance: Clear / S.010 / pH: x  Gluc: x / Ketone: Negative  / Bili: Negative / Urobili: Negative   Blood: x / Protein: 15 / Nitrite: Positive   Leuk Esterase: Negative / RBC: 2-5 /HPF / WBC 0-2 /HPF   Sq Epi: x / Non Sq Epi: Moderate /HPF / Bacteria: Many /HPF      CAPILLARY BLOOD GLUCOSE                Care Discussed with Consultants :     Plan of care was discussed with patient ; all questions and concerns were addressed and care was aligned with patient's wishes.

## 2018-10-01 NOTE — PHYSICAL THERAPY INITIAL EVALUATION ADULT - WORK/LEISURE ACTIVITY, REHAB EVAL
HISTORY OF PRESENT ILLNESS  Octavio Crook is a 40 y.o. female. HPI ESTABLISHED patient here for painful RIGHT breast cyst. Rates pain 5/10 at this time. Saw Dr. Fran Goldmann in 2016 for symptomatic LEFT breast cysts which she had aspirated at that time. She has noticed the RIGHT breast cyst now for 1 week. Has not had breast imaging since . FH includes 3 maternal aunts with breast cancer, all in their 52's. 1 , 2 living. Review of Systems   All other systems reviewed and are negative. Physical Exam   Pulmonary/Chest: Right breast exhibits mass. Right breast exhibits no inverted nipple, no nipple discharge (3:00 subareolar round mobile mass), no skin change and no tenderness. Left breast exhibits mass (1:00 mobile mass c/w cyst prior imaging). Left breast exhibits no inverted nipple, no nipple discharge, no skin change and no tenderness. Breasts are symmetrical.       Nursing note and vitals reviewed. BREAST ULTRASOUND  Indication: right breast mass 3:00   Technique: The area was scanned using a high-frequency linear-array near-field transducer  Findings: 1.5 x 1.5 cm mass, oval, hypoechoic, through transmission  Impression: Benign cyst  Disposition: Discussed aspiration or observation. Pt has elected for aspiration    ASPIRATION OF BREAST CYST  Indication : CYST:  right  3:00 ra  Ultrasound Findings: see above  Prep : Alcohol. Anesthesia : Lidocaine with epinephrine, 5 cc  Guidance : Ultrasound guidance. Yield :  8 cc clear fluid was aspirated with an 18 gauge needle. Effect : Cyst completely aspirated. Diposition:  Follow up if new mass occurs. Order mammograms, us since she is overdue. ASSESSMENT and PLAN    ICD-10-CM ICD-9-CM    1. Cyst of right breast N60.01 610.0      S/p aspiration of right breast cyst. She is overdue f/u mammograms, us. Will schedule. independent

## 2018-10-01 NOTE — ED ADULT NURSE REASSESSMENT NOTE - COMFORT CARE
side rails up/wait time explained/warm blanket provided/meal provided/assisted to bedpan
warm blanket provided/assisted to bedpan

## 2018-10-01 NOTE — PHYSICAL THERAPY INITIAL EVALUATION ADULT - TRANSFER SAFETY CONCERNS NOTED: SIT/STAND, REHAB EVAL
inability to maintain weight-bearing restrictions w/o assist/decreased safety awareness/losing balance/decreased balance during turns

## 2018-10-01 NOTE — PROGRESS NOTE ADULT - SUBJECTIVE AND OBJECTIVE BOX
NP Note     Patient is a 89y old  Female who presents with a chief complaint of fall (01 Oct 2018 12:50)  CT showed Extensive bilateral facial bone fractures including right orbit, left inferior orbital floor, fractures of nearly every wall of the maxillary sinuses,   bilateral nasal bone and bilateral frontal recesses of the maxilla   Dr. Bullock ENT recommended conservative management.   Xray distal radial right head fracture. Seen by ortho, sugar tongue splint in place.   No signs of arrhythmia on telemetry monitoring   Pt seen at bedside, NAD, appears comfortable, alert, awake.   Pt denies chest pain, headache, vomiting, right arm/hand numbness, fever.       MEDICATIONS  (STANDING):  enoxaparin Injectable 40 milliGRAM(s) SubCutaneous daily    MEDICATIONS  (PRN):  acetaminophen   Tablet .. 650 milliGRAM(s) Oral every 6 hours PRN Temp greater or equal to 38C (100.4F), Mild Pain (1 - 3)  ibuprofen  Tablet. 400 milliGRAM(s) Oral every 6 hours PRN Moderate Pain (4 - 6)      __________________________________________________  REVIEW OF SYSTEMS:    CONSTITUTIONAL: No fever,   EYES: no acute visual disturbances  NECK: No pain or stiffness  RESPIRATORY: No cough; No shortness of breath  CARDIOVASCULAR: No chest pain, no palpitations  GASTROINTESTINAL: No pain. No nausea or vomiting; No diarrhea   NEUROLOGICAL: No headache or numbness, no tremors  MUSCULOSKELETAL:  + facial soreness, + right forearm discomfort   GENITOURINARY: no dysuria, no frequency, no hesitancy  PSYCHIATRY: no depression , no anxiety  ALL OTHER  ROS negative        Vital Signs Last 24 Hrs  T(C): 36.5 (01 Oct 2018 10:56), Max: 36.8 (30 Sep 2018 23:38)  T(F): 97.7 (01 Oct 2018 10:56), Max: 98.2 (30 Sep 2018 23:38)  HR: 90 (01 Oct 2018 10:56) (78 - 99)  BP: 155/64 (01 Oct 2018 10:56) (129/65 - 155/64)  BP(mean): --  RR: 16 (01 Oct 2018 10:56) (16 - 18)  SpO2: 100% (01 Oct 2018 10:56) (97% - 100%)    ________________________________________________  PHYSICAL EXAM:  GENERAL: NAD  HEENT: Normocephalic;  conjunctivae and sclerae clear; moist mucous membranes;   NECK : supple  CHEST/LUNG: Clear to ausculitation bilaterally with good air entry   HEART: S1 S2  regular; no murmurs, gallops or rubs  ABDOMEN: Soft, Nontender, Nondistended; Bowel sounds present  EXTREMITIES: no cyanosis; no edema; no calf tenderness, + right forearm in splint, intact, +fingers warm to touch, mobile   SKIN: warm and dry; no rash  NERVOUS SYSTEM:  Awake and alert; Oriented  to place, person and time ; no new deficits    _________________________________________________  LABS:                        11.7   6.6   )-----------( 261      ( 01 Oct 2018 05:46 )             36.4     10-    139  |  104  |  25<H>  ----------------------------<  95  4.3   |  29  |  0.82    Ca    9.1      01 Oct 2018 05:46  Phos  2.8     10-  Mg     2.1     10-    TPro  6.5  /  Alb  2.8<L>  /  TBili  0.5  /  DBili  x   /  AST  88<H>  /  ALT  98<H>  /  AlkPhos  145<H>  10-    PT/INR - ( 30 Sep 2018 08:30 )   PT: 10.5 sec;   INR: 0.96 ratio         PTT - ( 30 Sep 2018 08:30 )  PTT:26.5 sec  Urinalysis Basic - ( 30 Sep 2018 11:02 )    Color: Yellow / Appearance: Clear / S.010 / pH: x  Gluc: x / Ketone: Negative  / Bili: Negative / Urobili: Negative   Blood: x / Protein: 15 / Nitrite: Positive   Leuk Esterase: Negative / RBC: 2-5 /HPF / WBC 0-2 /HPF   Sq Epi: x / Non Sq Epi: Moderate /HPF / Bacteria: Many /HPF      CAPILLARY BLOOD GLUCOSE            RADIOLOGY & ADDITIONAL TESTS:    Imaging Personally Reviewed:  YES/NO    Consultant(s) Notes Reviewed:   YES/ No    Care Discussed with Consultants :     Plan of care was discussed with patient and /or primary care giver; all questions and concerns were addressed and care was aligned with patient's wishes.

## 2018-10-01 NOTE — CONSULT NOTE ADULT - ATTENDING COMMENTS
89 yo female with right distal radius fracture.  PLAN: non-op treatment.  Keep Sugar tong splint on  F/U in one week.                     Dr. Davis at 8:15 pm 89 yo female with right distal radius fracture.  PLAN: non-op treatment.  Keep Sugar tong splint on  F/U in one week.                     Dr. Davis at 8:15 pm    10/02/2018  Saw pt at bed side.   Pt is A+Ox3  Right wrist in sugar tong splint.   Hand is not swollen and has active ROM and good sensation.  Reviewed xrays right wrist: Minimally angulated DR yanez.  Plan for Nonop treatment.  Called CLARICE BRITO who will come today for changing the splint into a plaster sugartong splint.  If d/c, f/u in office in 10 days. discussed with Primary team Dr. Meza                 ---- Dr. Davis at 4:22 pm

## 2018-10-02 LAB
ANION GAP SERPL CALC-SCNC: 8 MMOL/L — SIGNIFICANT CHANGE UP (ref 5–17)
BUN SERPL-MCNC: 29 MG/DL — HIGH (ref 7–18)
CALCIUM SERPL-MCNC: 9.2 MG/DL — SIGNIFICANT CHANGE UP (ref 8.4–10.5)
CHLORIDE SERPL-SCNC: 104 MMOL/L — SIGNIFICANT CHANGE UP (ref 96–108)
CO2 SERPL-SCNC: 27 MMOL/L — SIGNIFICANT CHANGE UP (ref 22–31)
CREAT SERPL-MCNC: 0.95 MG/DL — SIGNIFICANT CHANGE UP (ref 0.5–1.3)
GLUCOSE SERPL-MCNC: 99 MG/DL — SIGNIFICANT CHANGE UP (ref 70–99)
HCT VFR BLD CALC: 35.5 % — SIGNIFICANT CHANGE UP (ref 34.5–45)
HGB BLD-MCNC: 11.4 G/DL — LOW (ref 11.5–15.5)
MCHC RBC-ENTMCNC: 29 PG — SIGNIFICANT CHANGE UP (ref 27–34)
MCHC RBC-ENTMCNC: 32 GM/DL — SIGNIFICANT CHANGE UP (ref 32–36)
MCV RBC AUTO: 90.6 FL — SIGNIFICANT CHANGE UP (ref 80–100)
PLATELET # BLD AUTO: 280 K/UL — SIGNIFICANT CHANGE UP (ref 150–400)
POTASSIUM SERPL-MCNC: 3.9 MMOL/L — SIGNIFICANT CHANGE UP (ref 3.5–5.3)
POTASSIUM SERPL-SCNC: 3.9 MMOL/L — SIGNIFICANT CHANGE UP (ref 3.5–5.3)
RBC # BLD: 3.92 M/UL — SIGNIFICANT CHANGE UP (ref 3.8–5.2)
RBC # FLD: 12.8 % — SIGNIFICANT CHANGE UP (ref 10.3–14.5)
SODIUM SERPL-SCNC: 139 MMOL/L — SIGNIFICANT CHANGE UP (ref 135–145)
WBC # BLD: 4.5 K/UL — SIGNIFICANT CHANGE UP (ref 3.8–10.5)
WBC # FLD AUTO: 4.5 K/UL — SIGNIFICANT CHANGE UP (ref 3.8–10.5)

## 2018-10-02 RX ORDER — HALOPERIDOL DECANOATE 100 MG/ML
1 INJECTION INTRAMUSCULAR ONCE
Qty: 0 | Refills: 0 | Status: COMPLETED | OUTPATIENT
Start: 2018-10-02 | End: 2018-10-02

## 2018-10-02 RX ADMIN — HALOPERIDOL DECANOATE 1 MILLIGRAM(S): 100 INJECTION INTRAMUSCULAR at 01:37

## 2018-10-02 RX ADMIN — ENOXAPARIN SODIUM 40 MILLIGRAM(S): 100 INJECTION SUBCUTANEOUS at 11:48

## 2018-10-02 NOTE — PROGRESS NOTE ADULT - PROBLEM SELECTOR PLAN 1
Syncope could vasovagal syncope   Troponin x 2 neg  Echo: pef with grade 3 dd. with severe AS,  CT head neg for acute infract   Carotid D: neg for hemodynamically significant stenosis  Less likely neurological cause.

## 2018-10-02 NOTE — PROGRESS NOTE ADULT - PROBLEM SELECTOR PLAN 2
sugar tongue splint in place  - conservative measures   - follow up with ortho as outpatient in 10 days  -Dr. Davis

## 2018-10-02 NOTE — PROGRESS NOTE ADULT - PROBLEM SELECTOR PLAN 2
sugar tongue splint in place  - conservative measures   - follow up with ortho as outpatient sugar tongue splint in place  - conservative measures   - follow up with ortho as outpatient in 10 days  -Dr. Davis

## 2018-10-02 NOTE — ED ADULT NURSE REASSESSMENT NOTE - NS ED NURSE REASSESS COMMENT FT1
Late Entry :Addendum : Pt brought in to [facility], refusing to go inside the room, states the room is too dark.  Pt also states, she spoke to the doctor & was told she is going home tomorrow.  Spoke to Son In-law , Dilip & stated, pt is afraid that when she gets admitted, she will not be able to go home soon. And was told by family, that they will come in tomorrow to visit her.  Pt claimed that if she leaves ED to go upstairs, her family will not be able to find her anymore.  Ms Panda (AND) & Trista (AND) aware. Seen & evaluated by Dr Haney & confirmed that patient has no capacity to refused admission.
received pt awake alert oriented x3 not in distress,with saline lock intact no redness no swelling noted.
Pt denies any distress pain or discomfort telemetry DC awaiting for bed availability Pt care endorsed to Octavio MORALES.
received pt alert and verbally responsive NAD denies pain or discomfort at present safety maintained telemetry in progress box D.

## 2018-10-02 NOTE — PROGRESS NOTE ADULT - SUBJECTIVE AND OBJECTIVE BOX
Saw pt at bed side.   Pt is A+Ox3  Right wrist in sugar tong splint.   Hand is not swollen and has active ROM and good sensation.  Reviewed xrays right wrist: Minimally angulated DR yanez.    Plan for Nonop treatment.  Called CLARICE BRITO who will come today for changing the splint into a plaster sugartong splint.  If d/c, f/u in office in 10 days. discussed with Primary team Dr. Meza                 ---- Dr. Davis at 4:22 pm

## 2018-10-02 NOTE — PROGRESS NOTE ADULT - PROBLEM SELECTOR PLAN 1
vasovagal syncope   Troponin x 2 neg  Echo: Normal LV Systolic Function with grade 3 dd. with severe AS,  CT head neg for acute infract   Carotid D: neg for hemodynamically significant stenosis  Less likely neurological cause.  Patient not candidate for SAVR

## 2018-10-02 NOTE — PROGRESS NOTE ADULT - ATTENDING COMMENTS
Patient was seen and examined by myself. Case was discussed with house staff in details. I have reviewed and agree with the plan as outlined above with edits where appropriate. Patient was seen and examined by myself. Case was discussed with house staff in details. I have reviewed and agree with the plan as outlined above with edits where appropriate.    Clinically stable and improved  - Continue pain control measures  Discussed with ortho  surgeon at bedside;- no acute surgical intervention at this time and recommends; outpatient follow up.

## 2018-10-02 NOTE — PROGRESS NOTE ADULT - SUBJECTIVE AND OBJECTIVE BOX
89/F form home walks with walker with PMH of left eye blindness presents after fall at home. On day of presentation patient woke up to go to bathroom, while her way back she fell, she does not remember what happened, she briefly lost consciousness but denies any confusion or incontinence.     INTERVAL HPI/OVERNIGHT EVENTS: offers no new complaints; current symptoms resolving    MEDICATIONS  (STANDING):  enoxaparin Injectable 40 milliGRAM(s) SubCutaneous daily    MEDICATIONS  (PRN):  acetaminophen   Tablet .. 650 milliGRAM(s) Oral every 6 hours PRN Temp greater or equal to 38C (100.4F), Mild Pain (1 - 3)  ibuprofen  Tablet. 400 milliGRAM(s) Oral every 6 hours PRN Moderate Pain (4 - 6)      __________________________________________________  REVIEW OF SYSTEMS:    CONSTITUTIONAL: No fever,   EYES: no acute visual disturbances  NECK: No pain or stiffness  RESPIRATORY: No cough; No shortness of breath  CARDIOVASCULAR: No chest pain, no palpitations  GASTROINTESTINAL: No pain. No nausea or vomiting; No diarrhea   NEUROLOGICAL: No headache or numbness, no tremors  MUSCULOSKELETAL: No joint pain, no muscle pain  GENITOURINARY: no dysuria, no frequency, no hesitancy  PSYCHIATRY: no depression , no anxiety  ALL OTHER  ROS negative        Vital Signs Last 24 Hrs  T(C): 36.6 (02 Oct 2018 05:30), Max: 37.3 (01 Oct 2018 15:30)  T(F): 97.8 (02 Oct 2018 05:30), Max: 99.1 (01 Oct 2018 15:30)  HR: 82 (02 Oct 2018 05:30) (82 - 108)  BP: 146/64 (02 Oct 2018 05:30) (121/72 - 155/64)  RR: 18 (02 Oct 2018 05:30) (16 - 19)  SpO2: 98% (02 Oct 2018 05:30) (96% - 100%)    ________________________________________________  PHYSICAL EXAM:  GENERAL: NAD  HEENT: bruises present over face,  Normocephalic;  conjunctivae and sclerae clear; moist mucous membranes;   NECK : supple  CHEST/LUNG: Clear to auscultation bilaterally with good air entry   HEART: S1 S2  regular;3/6 Systolic ejection murmur  ABDOMEN: Soft, Nontender, Nondistended; Bowel sounds present  EXTREMITIES: no cyanosis; no edema; no calf tenderness  SKIN: warm and dry; no rash  NERVOUS SYSTEM:  Awake and alert; Oriented  to place, person and time ; no new deficits    _________________________________________________  LABS:                        11.4   4.5   )-----------( 280      ( 02 Oct 2018 05:51 )             35.5     10-    139  |  104  |  29<H>  ----------------------------<  99  3.9   |  27  |  0.95    Ca    9.2      02 Oct 2018 05:51  Phos  2.8     10-  Mg     2.1     10-    TPro  6.5  /  Alb  2.8<L>  /  TBili  0.5  /  DBili  x   /  AST  88<H>  /  ALT  98<H>  /  AlkPhos  145<H>  10-01    PT/INR - ( 30 Sep 2018 08:30 )   PT: 10.5 sec;   INR: 0.96 ratio         PTT - ( 30 Sep 2018 08:30 )  PTT:26.5 sec  Urinalysis Basic - ( 30 Sep 2018 11:02 )    Color: Yellow / Appearance: Clear / S.010 / pH: x  Gluc: x / Ketone: Negative  / Bili: Negative / Urobili: Negative   Blood: x / Protein: 15 / Nitrite: Positive   Leuk Esterase: Negative / RBC: 2-5 /HPF / WBC 0-2 /HPF   Sq Epi: x / Non Sq Epi: Moderate /HPF / Bacteria: Many /HPF      CAPILLARY BLOOD GLUCOSE

## 2018-10-02 NOTE — PROGRESS NOTE ADULT - SUBJECTIVE AND OBJECTIVE BOX
PGY 1 Note discussed with supervising resident and primary attending    Patient is a 89y old  Female who presents with a chief complaint of fall (01 Oct 2018 23:20)      INTERVAL HPI/OVERNIGHT EVENTS: offers no new complaints; current symptoms resolving    MEDICATIONS  (STANDING):  enoxaparin Injectable 40 milliGRAM(s) SubCutaneous daily    MEDICATIONS  (PRN):  acetaminophen   Tablet .. 650 milliGRAM(s) Oral every 6 hours PRN Temp greater or equal to 38C (100.4F), Mild Pain (1 - 3)  ibuprofen  Tablet. 400 milliGRAM(s) Oral every 6 hours PRN Moderate Pain (4 - 6)      __________________________________________________  REVIEW OF SYSTEMS:    CONSTITUTIONAL: No fever,   EYES: no acute visual disturbances  NECK: No pain or stiffness  RESPIRATORY: No cough; No shortness of breath  CARDIOVASCULAR: No chest pain, no palpitations  GASTROINTESTINAL: No pain. No nausea or vomiting; No diarrhea   NEUROLOGICAL: No headache or numbness, no tremors  MUSCULOSKELETAL: No joint pain, no muscle pain  GENITOURINARY: no dysuria, no frequency, no hesitancy  PSYCHIATRY: no depression , no anxiety  ALL OTHER  ROS negative        Vital Signs Last 24 Hrs  T(C): 36.6 (02 Oct 2018 05:30), Max: 37.3 (01 Oct 2018 15:30)  T(F): 97.8 (02 Oct 2018 05:30), Max: 99.1 (01 Oct 2018 15:30)  HR: 82 (02 Oct 2018 05:30) (82 - 108)  BP: 146/64 (02 Oct 2018 05:30) (121/72 - 155/64)  BP(mean): --  RR: 18 (02 Oct 2018 05:30) (16 - 19)  SpO2: 98% (02 Oct 2018 05:30) (96% - 100%)    ________________________________________________  PHYSICAL EXAM:  GENERAL: NAD  HEENT: Normocephalic;  conjunctivae and sclerae clear; moist mucous membranes;   NECK : supple  CHEST/LUNG: Clear to auscultation bilaterally with good air entry   HEART: S1 S2  regular; AS murmur  ABDOMEN: Soft, Nontender, Nondistended; Bowel sounds present  EXTREMITIES: no cyanosis; no edema; no calf tenderness  SKIN: warm and dry; no rash  NERVOUS SYSTEM:  Awake and alert; Oriented  to place, person and time ; no new deficits    _________________________________________________  LABS:                        11.4   4.5   )-----------( 280      ( 02 Oct 2018 05:51 )             35.5     10-02    139  |  104  |  29<H>  ----------------------------<  99  3.9   |  27  |  0.95    Ca    9.2      02 Oct 2018 05:51  Phos  2.8     10-  Mg     2.1     10-    TPro  6.5  /  Alb  2.8<L>  /  TBili  0.5  /  DBili  x   /  AST  88<H>  /  ALT  98<H>  /  AlkPhos  145<H>  10-01    PT/INR - ( 30 Sep 2018 08:30 )   PT: 10.5 sec;   INR: 0.96 ratio         PTT - ( 30 Sep 2018 08:30 )  PTT:26.5 sec  Urinalysis Basic - ( 30 Sep 2018 11:02 )    Color: Yellow / Appearance: Clear / S.010 / pH: x  Gluc: x / Ketone: Negative  / Bili: Negative / Urobili: Negative   Blood: x / Protein: 15 / Nitrite: Positive   Leuk Esterase: Negative / RBC: 2-5 /HPF / WBC 0-2 /HPF   Sq Epi: x / Non Sq Epi: Moderate /HPF / Bacteria: Many /HPF      CAPILLARY BLOOD GLUCOSE            RADIOLOGY & ADDITIONAL TESTS:    Imaging Personally Reviewed:  YES/NO    Consultant(s) Notes Reviewed:   YES/ No    Care Discussed with Consultants :     Plan of care was discussed with patient and /or primary care giver; all questions and concerns were addressed and care was aligned with patient's wishes. PGY 1 Note discussed with supervising resident and primary attending    Patient is a 89y old  Female who presents with a chief complaint of fall (01 Oct 2018 23:20)      INTERVAL HPI/OVERNIGHT EVENTS: offers no new complaints; current symptoms resolving    MEDICATIONS  (STANDING):  enoxaparin Injectable 40 milliGRAM(s) SubCutaneous daily    MEDICATIONS  (PRN):  acetaminophen   Tablet .. 650 milliGRAM(s) Oral every 6 hours PRN Temp greater or equal to 38C (100.4F), Mild Pain (1 - 3)  ibuprofen  Tablet. 400 milliGRAM(s) Oral every 6 hours PRN Moderate Pain (4 - 6)      __________________________________________________  REVIEW OF SYSTEMS:    CONSTITUTIONAL: No fever,   EYES: no acute visual disturbances  NECK: No pain or stiffness  RESPIRATORY: No cough; No shortness of breath  CARDIOVASCULAR: No chest pain, no palpitations  GASTROINTESTINAL: No pain. No nausea or vomiting; No diarrhea   NEUROLOGICAL: No headache or numbness, no tremors  MUSCULOSKELETAL: No joint pain, no muscle pain  GENITOURINARY: no dysuria, no frequency, no hesitancy  PSYCHIATRY: no depression , no anxiety  ALL OTHER  ROS negative        Vital Signs Last 24 Hrs  T(C): 36.6 (02 Oct 2018 05:30), Max: 37.3 (01 Oct 2018 15:30)  T(F): 97.8 (02 Oct 2018 05:30), Max: 99.1 (01 Oct 2018 15:30)  HR: 82 (02 Oct 2018 05:30) (82 - 108)  BP: 146/64 (02 Oct 2018 05:30) (121/72 - 155/64)  BP(mean): --  RR: 18 (02 Oct 2018 05:30) (16 - 19)  SpO2: 98% (02 Oct 2018 05:30) (96% - 100%)    ________________________________________________  PHYSICAL EXAM:  GENERAL: NAD  HEENT: bruises present over face,  Normocephalic;  conjunctivae and sclerae clear; moist mucous membranes;   NECK : supple  CHEST/LUNG: Clear to auscultation bilaterally with good air entry   HEART: S1 S2  regular; AS murmur  ABDOMEN: Soft, Nontender, Nondistended; Bowel sounds present  EXTREMITIES: no cyanosis; no edema; no calf tenderness  SKIN: warm and dry; no rash  NERVOUS SYSTEM:  Awake and alert; Oriented  to place, person and time ; no new deficits    _________________________________________________  LABS:                        11.4   4.5   )-----------( 280      ( 02 Oct 2018 05:51 )             35.5     10-    139  |  104  |  29<H>  ----------------------------<  99  3.9   |  27  |  0.95    Ca    9.2      02 Oct 2018 05:51  Phos  2.8     10-  Mg     2.1     10-    TPro  6.5  /  Alb  2.8<L>  /  TBili  0.5  /  DBili  x   /  AST  88<H>  /  ALT  98<H>  /  AlkPhos  145<H>  10-    PT/INR - ( 30 Sep 2018 08:30 )   PT: 10.5 sec;   INR: 0.96 ratio         PTT - ( 30 Sep 2018 08:30 )  PTT:26.5 sec  Urinalysis Basic - ( 30 Sep 2018 11:02 )    Color: Yellow / Appearance: Clear / S.010 / pH: x  Gluc: x / Ketone: Negative  / Bili: Negative / Urobili: Negative   Blood: x / Protein: 15 / Nitrite: Positive   Leuk Esterase: Negative / RBC: 2-5 /HPF / WBC 0-2 /HPF   Sq Epi: x / Non Sq Epi: Moderate /HPF / Bacteria: Many /HPF      CAPILLARY BLOOD GLUCOSE            RADIOLOGY & ADDITIONAL TESTS:    Imaging Personally Reviewed:  YES    Consultant(s) Notes Reviewed:   YES    Care Discussed with Consultants : Yes    Plan of care was discussed with patient and /or primary care giver; all questions and concerns were addressed and care was aligned with patient's wishes.

## 2018-10-03 VITALS
TEMPERATURE: 98 F | SYSTOLIC BLOOD PRESSURE: 129 MMHG | OXYGEN SATURATION: 98 % | DIASTOLIC BLOOD PRESSURE: 71 MMHG | HEART RATE: 98 BPM | RESPIRATION RATE: 18 BRPM

## 2018-10-03 PROCEDURE — 99233 SBSQ HOSP IP/OBS HIGH 50: CPT

## 2018-10-03 RX ORDER — ACETAMINOPHEN 500 MG
2 TABLET ORAL
Qty: 56 | Refills: 0
Start: 2018-10-03 | End: 2018-10-09

## 2018-10-03 RX ORDER — IBUPROFEN 200 MG
1 TABLET ORAL
Qty: 28 | Refills: 0 | OUTPATIENT
Start: 2018-10-03 | End: 2018-10-09

## 2018-10-03 RX ADMIN — ENOXAPARIN SODIUM 40 MILLIGRAM(S): 100 INJECTION SUBCUTANEOUS at 11:33

## 2018-10-03 NOTE — PROGRESS NOTE ADULT - PROBLEM SELECTOR PLAN 2
-Splint per ortho   - conservative measures   - follow up with ortho as outpatient in 10 days, Dr. Davis

## 2018-10-03 NOTE — PROGRESS NOTE ADULT - PROBLEM SELECTOR PROBLEM 2
Distal radius fracture, right
Facial fracture

## 2018-10-03 NOTE — DISCHARGE NOTE ADULT - HOSPITAL COURSE
89/F form home walks with walker with PMH of left eye blindness presents after fall at home. This morning at 5 AM patient woke up to go to bathroom, while her way back she fell, she does not remember what happened, she briefly lost consciousness admitted to the hospital for syncope work up and for radius.     In ED:   Vitals: 136/73, 94, 97.2, 18(97)  Labs: WBC of 16K   AST/ALT: 135/99 Alkaline phosphate: 153  EKG: NSR with no STT wave changes   UA: negative   CT head and cervical spine: no acute pathology needed.  CT facial bone: multiple fractures - ENT Dr. Bullock was consulted - conservative management.   Xray hand: Old injury deformity, however there is suspicion of a new distal radial right head fracture.      Problem/Plan - 1:  ·  Problem: Syncope.  Plan: unfitness fall with brief loss of consciousness  denies any confusion or incontinence after fall   likely vasovagal as right after BM   orthostatic negative   follow ECHO and CD   T1 negative follow T2 and T3   EKG NSR with no STT wave changes  Cardio Dr. Landeros.     Problem/Plan - 2:  ·  Problem: Facial fracture.  Plan: multiple fractures noted over fascial bone and orbital floor, sinuses   - ENT Dr. Bullock was consulted by ED - conservative management.  denies pain at this time   pain control with advil and tylenol.      Problem/Plan - 3:  ·  Problem: Distal radius fracture, right.  Plan: non displaced fracture   splint was applied by ED   follow up with ortho   pain management. 89/F form home walks with walker with PMH of left eye blindness presents after fall at home. This morning at 5 AM patient woke up to go to bathroom, while her way back she fell, she does not remember what happened, she briefly lost consciousness admitted to the hospital for syncope work up and for radius.     In ED:   Vitals: 136/73, 94, 97.2, 18(97), Labs: WBC of 16K, AST/ALT: 135/99 Alkaline phosphate: 153, EKG: NSR with no STT wave changes, UA: negative   CT head and cervical spine: no acute pathology needed.  CT facial bone: multiple fractures - ENT Dr. Bullock was consulted - conservative management.   Xray hand: Old injury deformity, however there is suspicion of a new distal radial right head fracture.     syncope.  Plan: unfitness fall with brief loss of consciousness, denies any confusion or incontinence after fall, likely vasovagal as right after BM, orthostatic negative, follow ECHO: severe AS with Pef and Grade 3 DD and CD, T1,2 negative, EKG NSR with no STT wave changes  Cardio Dr. Landeros not a candidate for TAVR      Facial fracture.  Plan: multiple fractures noted over fascial bone and orbital floor, sinuses, NT Dr. Bullock was consulted by ED - conservative management, pain control with advil and tylenol.     Distal radius fracture, right.  Plan: non displaced fracture Dr. Davis show the pt and recommended conservative treatment with out pt follow up    Pt was examined bedside and medically stable for discharge to [facility] as per PT. D/w Dr. Meza. 89/F form home walks with walker with PMH of left eye blindness presents after fall at home. This morning at 5 AM patient woke up to go to bathroom, while her way back she fell, she does not remember what happened, she briefly lost consciousness admitted to the hospital for syncope work up and for radius.     In ED:   Vitals: 136/73, 94, 97.2, 18(97), Labs: WBC of 16K, AST/ALT: 135/99 Alkaline phosphate: 153, EKG: NSR with no STT wave changes, UA: negative   CT head and cervical spine: no acute pathology needed.  CT facial bone: multiple fractures - ENT Dr. Bullock was consulted - conservative management.   Xray hand: Old injury deformity, however there is suspicion of a new distal radial right head fracture.     syncope.  Plan: unfitness fall with brief loss of consciousness, denies any confusion or incontinence after fall, likely vasovagal as right after BM, orthostatic negative, follow ECHO: severe AS with Pef and Grade 3 DD and CD, T1,2 negative, EKG NSR with no STT wave changes  Cardio Dr. Landeros not a candidate for TAVR      Facial fracture.  Plan: multiple fractures noted over fascial bone and orbital floor, sinuses, NT Dr. Bullock was consulted by ED - conservative management, pain control with advil and tylenol.     Distal radius fracture, right.  Plan: non displaced fracture - seen in consultation by orthopedic surgeon Dr. Davis who recommended conservative treatment with out pt follow up.    Pt was examined bedside and medically stable for discharge to [facility] as per PT. D/w Dr. Meza.

## 2018-10-03 NOTE — DISCHARGE NOTE ADULT - MEDICATION SUMMARY - MEDICATIONS TO TAKE
I will START or STAY ON the medications listed below when I get home from the hospital:    ibuprofen 400 mg oral tablet  -- 1 tab(s) by mouth every 6 hours, As needed, Moderate Pain (4 - 6)  -- Indication: For Pain    acetaminophen 325 mg oral tablet  -- 2 tab(s) by mouth every 6 hours, As needed, Temp greater or equal to 38C (100.4F), Mild Pain (1 - 3)  -- Indication: For Pain and fever I will START or STAY ON the medications listed below when I get home from the hospital:    acetaminophen 325 mg oral tablet  -- 2 tab(s) by mouth every 6 hours, As needed, Temp greater or equal to 38C (100.4F), Mild Pain (1 - 3)  -- Indication: For Pain and fever

## 2018-10-03 NOTE — DISCHARGE NOTE ADULT - PATIENT PORTAL LINK FT
You can access the [facility] Patient Portal, offered by [facility], by registering with the following website: [email or web address]

## 2018-10-03 NOTE — PROGRESS NOTE ADULT - ATTENDING COMMENTS
Thank you for the courtesy of the consultation,I would be available for any further discussion if needed.  Gary Landeros MD,FACC.  [address] [place], [place] [id number] [phone]

## 2018-10-03 NOTE — PROGRESS NOTE ADULT - PROBLEM SELECTOR PLAN 3
- conservative management as per ENT recommendation   - pain control   - PT eval
- conservative management as per ENT recommendation   - pain control   - PT eval- [facility]ERTO
- conservative management as per ENT recommendation   - pain control   - PT
non displaced fracture of R distal radius  seen by ortho  Splint in place  pain control

## 2018-10-03 NOTE — PROGRESS NOTE ADULT - SUBJECTIVE AND OBJECTIVE BOX
PGY 1 Note discussed with supervising resident and primary attending    Patient is a 89y old  Female who presents with a chief complaint of fall (03 Oct 2018 06:14)      INTERVAL HPI/OVERNIGHT EVENTS: pt was complaining for eye redness.     MEDICATIONS  (STANDING):  enoxaparin Injectable 40 milliGRAM(s) SubCutaneous daily    MEDICATIONS  (PRN):  acetaminophen   Tablet .. 650 milliGRAM(s) Oral every 6 hours PRN Temp greater or equal to 38C (100.4F), Mild Pain (1 - 3)  ibuprofen  Tablet. 400 milliGRAM(s) Oral every 6 hours PRN Moderate Pain (4 - 6)      __________________________________________________  REVIEW OF SYSTEMS:    CONSTITUTIONAL: No fever,   EYES: no acute visual disturbances  NECK: No pain or stiffness  RESPIRATORY: No cough; No shortness of breath  CARDIOVASCULAR: No chest pain, no palpitations  GASTROINTESTINAL: No pain. No nausea or vomiting; No diarrhea   NEUROLOGICAL: No headache or numbness, no tremors  MUSCULOSKELETAL: No joint pain, no muscle pain  GENITOURINARY: no dysuria, no frequency, no hesitancy  PSYCHIATRY: no depression , no anxiety  ALL OTHER  ROS negative        Vital Signs Last 24 Hrs  T(C): 36.5 (03 Oct 2018 05:02), Max: 36.6 (02 Oct 2018 15:10)  T(F): 97.7 (03 Oct 2018 05:02), Max: 97.8 (02 Oct 2018 15:10)  HR: 76 (03 Oct 2018 05:02) (71 - 78)  BP: 143/61 (03 Oct 2018 05:02) (120/74 - 143/61)  BP(mean): --  RR: 18 (03 Oct 2018 05:02) (17 - 18)  SpO2: 98% (03 Oct 2018 05:02) (98% - 100%)    ________________________________________________  PHYSICAL EXAM:  GENERAL: NAD  HEENT: bruises present over face,  Normocephalic;  conjunctivae and sclerae clear; moist mucous membranes;   NECK : supple  CHEST/LUNG: Clear to auscultation bilaterally with good air entry   HEART: S1 S2  regular; AS murmur  ABDOMEN: Soft, Nontender, Nondistended; Bowel sounds present  EXTREMITIES: Splint on right arm.    SKIN: warm and dry; no rash  NERVOUS SYSTEM:  Awake and alert; Oriented  to place, person and time ; no new deficits    _________________________________________________  LABS:                        11.4   4.5   )-----------( 280      ( 02 Oct 2018 05:51 )             35.5     10-02    139  |  104  |  29<H>  ----------------------------<  99  3.9   |  27  |  0.95    Ca    9.2      02 Oct 2018 05:51          CAPILLARY BLOOD GLUCOSE                Imaging Personally Reviewed:  YES    Consultant(s) Notes Reviewed:   YES        Plan of care was discussed with patient and /or primary care giver; all questions and concerns were addressed and care was aligned with patient's wishes.

## 2018-10-03 NOTE — DISCHARGE NOTE ADULT - CARE PLAN
Principal Discharge DX:	Syncope  Goal:	To follow up with primary care doctor in a week  Assessment and plan of treatment:	You were admitted to the hospital after passing out and falling on the floor. Your EKG showed lined sinus rhythm with LAD, cardiac enzyme was negative for ischemia 2 times, you had echocardiogram which showed severe aortic stenosis, normal ventricular function with grade 3 diastolic dysfunction. you were  by cardiologist.  Secondary Diagnosis:	Distal radius fracture, right  Assessment and plan of treatment:	You were seen by Dr. Davis and Dr. Davis recommended conservative management with Splint. Please take your pain medications as advised. Pleas see Dr. Davis within ONE week in office at: #[phone]  Secondary Diagnosis:	Facial fracture  Assessment and plan of treatment:	Dr. Bullock was consulted for multiple facial fracture by Emergency department physician and recommended conservative management with pain medications. Please see your primary care doctor in  a week.

## 2018-10-03 NOTE — PROGRESS NOTE ADULT - PROBLEM SELECTOR PLAN 1
vasovagal syncope likely   CT head neg for acute infract   Carotid D: neg for hemodynamically significant stenosis

## 2018-10-03 NOTE — PROGRESS NOTE ADULT - SUBJECTIVE AND OBJECTIVE BOX
MEDICAL ATTENDING NOTE    Patient is a 89y old  Female who presents with a chief complaint of fall (02 Oct 2018 16:27)      INTERVAL HPI/OVERNIGHT EVENTS: no new complaints    MEDICATIONS  (STANDING):  enoxaparin Injectable 40 milliGRAM(s) SubCutaneous daily    MEDICATIONS  (PRN):  acetaminophen   Tablet .. 650 milliGRAM(s) Oral every 6 hours PRN Temp greater or equal to 38C (100.4F), Mild Pain (1 - 3)  ibuprofen  Tablet. 400 milliGRAM(s) Oral every 6 hours PRN Moderate Pain (4 - 6)      __________________________________________________  REVIEW OF SYSTEMS:    CONSTITUTIONAL: No fever,   EYES: no acute visual disturbances  NECK: No pain or stiffness  RESPIRATORY: No cough; No shortness of breath  CARDIOVASCULAR: No chest pain, no palpitations  GASTROINTESTINAL: No pain. No nausea or vomiting; No diarrhea   NEUROLOGICAL: No headache or numbness, no tremors  MUSCULOSKELETAL: No joint pain, no muscle pain  GENITOURINARY: no dysuria, no frequency, no hesitancy  ALL OTHER  ROS negative        Vital Signs Last 24 Hrs  T(C): 36.5 (03 Oct 2018 05:02), Max: 36.6 (02 Oct 2018 15:10)  T(F): 97.7 (03 Oct 2018 05:02), Max: 97.8 (02 Oct 2018 15:10)  HR: 76 (03 Oct 2018 05:02) (71 - 78)  BP: 143/61 (03 Oct 2018 05:02) (120/74 - 143/61)  BP(mean): --  RR: 18 (03 Oct 2018 05:02) (17 - 18)  SpO2: 98% (03 Oct 2018 05:02) (98% - 100%)    ________________________________________________  PHYSICAL EXAM:  GENERAL: NAD  HEENT: Normocephalic;  conjunctivae and sclerae clear; moist mucous membranes;   NECK : supple  CHEST/LUNG: Clear to auscultation bilaterally with good air entry   HEART: S1 S2  regular; IV Sys murmur, no gallops or rubs  ABDOMEN: Soft, Nontender, Nondistended; Bowel sounds present  EXTREMITIES: no cyanosis; no edema; no calf tenderness, splint over wrist   SKIN: warm and dry; no rash, scattered skin tears, abrasions, ecchymosis under left eye   NERVOUS SYSTEM:  Awake and alert; Oriented  to place, person and time ; no new deficits    _________________________________________________  LABS:                        11.4   4.5   )-----------( 280      ( 02 Oct 2018 05:51 )             35.5     10-02    139  |  104  |  29<H>  ----------------------------<  99  3.9   |  27  |  0.95    Ca    9.2      02 Oct 2018 05:51          CAPILLARY BLOOD GLUCOSE            RADIOLOGY & ADDITIONAL TESTS:    Imaging Personally Reviewed:  YES    Consultant(s) Notes Reviewed:   YES        Plan of care was discussed with patient and /or primary care giver; all questions and concerns were addressed and care was aligned with patient's wishes.

## 2018-10-03 NOTE — DISCHARGE NOTE ADULT - CARE PROVIDER_API CALL
Nahed Davis), Orthopaedic Surgery  [id number] [place] [address]  [address] [place] [address]  Phone: [phone]  Fax: [phone]

## 2018-10-03 NOTE — PROGRESS NOTE ADULT - PROBLEM SELECTOR PROBLEM 4
Need for prophylactic measure
Acute pain due to injury
Need for prophylactic measure

## 2018-10-03 NOTE — DISCHARGE NOTE ADULT - PLAN OF CARE
To follow up with primary care doctor in a week You were admitted to the hospital after passing out and falling on the floor. Your EKG showed lined sinus rhythm with LAD, cardiac enzyme was negative for ischemia 2 times, you had echocardiogram which showed severe aortic stenosis, normal ventricular function with grade 3 diastolic dysfunction. you were  by cardiologist. You were seen by Dr. Davis and Dr. Davis recommended conservative management with Splint. Please take your pain medications as advised. Pleas see Dr. Davis within ONE week in office at: #[phone] Dr. Bullock was consulted for multiple facial fracture by Emergency department physician and recommended conservative management with pain medications. Please see your primary care doctor in  a week.

## 2018-10-03 NOTE — PROGRESS NOTE ADULT - ASSESSMENT
Assessment and Plan:   Problem/Plan - 1:  ·  Problem: Syncope.  Plan:Likely Vasovagal syncope.Chronic Diastolic Heart Failure with severe aortic Stenosis   Troponin x 2 neg  Echo: Normal LV Systolic Function with grade 3 dd. with severe AS,  CT head neg for acute infract   Carotid D: neg for hemodynamically significant stenosis  Less likely neurological cause.  Patient not candidate for SAVR.  For Rehab-avoid diuretics      Problem/Plan - 2:  ·  Problem: Distal radius fracture, right.  Plan: sugar tongue splint in place  - conservative measures   - follow up with ortho as outpatient in 10 days  -Dr. Davis.     Problem/Plan - 3:  ·  Problem: Facial fracture.  Plan: - conservative management as per ENT recommendation   - pain control   - PT eval.

## 2018-10-03 NOTE — PROGRESS NOTE ADULT - PROBLEM SELECTOR PLAN 4
Lovenox sq daily
avoid opioids if feasible  - Tylenol as needed for mild to moderate pain  - Low dose Tramadol for severe pain as needed

## 2018-10-03 NOTE — PROGRESS NOTE ADULT - PROBLEM SELECTOR PLAN 1
vasovagal syncope   Troponin x 2 neg  Echo: pef with grade 3 dd. with severe AS,  CT head neg for acute infract   Carotid D: neg for hemodynamically significant stenosis  Less likely neurological cause.  OBB to chair  Pt was accepted by [facility] for [facility]ESUS ALBERTO,  informed waiting for insurance approval.

## 2018-10-03 NOTE — PROGRESS NOTE ADULT - SUBJECTIVE AND OBJECTIVE BOX
89/F form home walks with walker with PMH of left eye blindness presents after fall at home. On day of presentation patient woke up to go to bathroom, while her way back she fell, she does not remember what happened, she briefly lost consciousness but denies any confusion or incontinence. Noted orbital ecchymoses,no dizziness or chest pain    INTERVAL HPI/OVERNIGHT EVENTS: pt was complaining for eye redness.     MEDICATIONS  (STANDING):  enoxaparin Injectable 40 milliGRAM(s) SubCutaneous daily    MEDICATIONS  (PRN):  acetaminophen   Tablet .. 650 milliGRAM(s) Oral every 6 hours PRN Temp greater or equal to 38C (100.4F), Mild Pain (1 - 3)  ibuprofen  Tablet. 400 milliGRAM(s) Oral every 6 hours PRN Moderate Pain (4 - 6)      __________________________________________________  REVIEW OF SYSTEMS:    CONSTITUTIONAL: No fever,   EYES: no acute visual disturbances  NECK: No pain or stiffness  RESPIRATORY: No cough; No shortness of breath  CARDIOVASCULAR: No chest pain, no palpitations  GASTROINTESTINAL: No pain. No nausea or vomiting; No diarrhea   NEUROLOGICAL: No headache or numbness, no tremors  MUSCULOSKELETAL: No joint pain, no muscle pain  GENITOURINARY: no dysuria, no frequency, no hesitancy  PSYCHIATRY: no depression , no anxiety  ALL OTHER  ROS negative        Vital Signs Last 24 Hrs  T(C): 36.5 (03 Oct 2018 05:02), Max: 36.6 (02 Oct 2018 15:10)  T(F): 97.7 (03 Oct 2018 05:02), Max: 97.8 (02 Oct 2018 15:10)  HR: 76 (03 Oct 2018 05:02) (71 - 78)  BP: 143/61 (03 Oct 2018 05:02) (120/74 - 143/61)  RR: 18 (03 Oct 2018 05:02) (17 - 18)  SpO2: 98% (03 Oct 2018 05:02) (98% - 100%)    ________________________________________________  PHYSICAL EXAM:  GENERAL: NAD  HEENT: bruises present over face,  Normocephalic;  conjunctivae and sclerae clear; moist mucous membranes;   NECK : supple  CHEST/LUNG: Clear to auscultation bilaterally with good air entry   HEART: S1 S2  regular; 3/6 systolic ejection murmur  ABDOMEN: Soft, Nontender, Nondistended; Bowel sounds present  EXTREMITIES: Splint on right arm.    SKIN: warm and dry; no rash  NERVOUS SYSTEM:  Awake and alert; Oriented  to place, person and time ; no new deficits    _________________________________________________  LABS:                        11.4   4.5   )-----------( 280      ( 02 Oct 2018 05:51 )             35.5     10-02    139  |  104  |  29<H>  ----------------------------<  99  3.9   |  27  |  0.95    Ca    9.2      02 Oct 2018 05:51          ECHO:- Study Date: 10/1/2018  Normal Left Ventricular Systolic Function,  (EF = 55 to 60%)  Grade III diastolic dysfunction.  Calcified trileaflet aortic valve with decreased opening with estimated aortic valve area equals 0.8 sqcm (by continuity equation), consistent with severe aortic stenosis.  RV systolic pressure is 35 mm Hg.

## 2018-11-11 PROCEDURE — 80048 BASIC METABOLIC PNL TOTAL CA: CPT

## 2018-11-11 PROCEDURE — 82962 GLUCOSE BLOOD TEST: CPT

## 2018-11-11 PROCEDURE — 81001 URINALYSIS AUTO W/SCOPE: CPT

## 2018-11-11 PROCEDURE — 82553 CREATINE MB FRACTION: CPT

## 2018-11-11 PROCEDURE — 82746 ASSAY OF FOLIC ACID SERUM: CPT

## 2018-11-11 PROCEDURE — 85610 PROTHROMBIN TIME: CPT

## 2018-11-11 PROCEDURE — 85027 COMPLETE CBC AUTOMATED: CPT

## 2018-11-11 PROCEDURE — 84484 ASSAY OF TROPONIN QUANT: CPT

## 2018-11-11 PROCEDURE — 83036 HEMOGLOBIN GLYCOSYLATED A1C: CPT

## 2018-11-11 PROCEDURE — 80053 COMPREHEN METABOLIC PANEL: CPT

## 2018-11-11 PROCEDURE — 72125 CT NECK SPINE W/O DYE: CPT

## 2018-11-11 PROCEDURE — 82607 VITAMIN B-12: CPT

## 2018-11-11 PROCEDURE — 80061 LIPID PANEL: CPT

## 2018-11-11 PROCEDURE — 93306 TTE W/DOPPLER COMPLETE: CPT

## 2018-11-11 PROCEDURE — 93880 EXTRACRANIAL BILAT STUDY: CPT

## 2018-11-11 PROCEDURE — 99285 EMERGENCY DEPT VISIT HI MDM: CPT | Mod: 25

## 2018-11-11 PROCEDURE — 82550 ASSAY OF CK (CPK): CPT

## 2018-11-11 PROCEDURE — 70450 CT HEAD/BRAIN W/O DYE: CPT

## 2018-11-11 PROCEDURE — 83735 ASSAY OF MAGNESIUM: CPT

## 2018-11-11 PROCEDURE — 93005 ELECTROCARDIOGRAM TRACING: CPT

## 2018-11-11 PROCEDURE — 70486 CT MAXILLOFACIAL W/O DYE: CPT

## 2018-11-11 PROCEDURE — 73110 X-RAY EXAM OF WRIST: CPT

## 2018-11-11 PROCEDURE — 73130 X-RAY EXAM OF HAND: CPT

## 2018-11-11 PROCEDURE — 85730 THROMBOPLASTIN TIME PARTIAL: CPT

## 2018-11-11 PROCEDURE — 82306 VITAMIN D 25 HYDROXY: CPT

## 2018-11-11 PROCEDURE — 71045 X-RAY EXAM CHEST 1 VIEW: CPT

## 2018-11-11 PROCEDURE — 84443 ASSAY THYROID STIM HORMONE: CPT

## 2018-11-11 PROCEDURE — 84100 ASSAY OF PHOSPHORUS: CPT

## 2020-03-01 ENCOUNTER — EMERGENCY (EMERGENCY)
Facility: HOSPITAL | Age: 85
LOS: 1 days | Discharge: HOME | End: 2020-03-01
Attending: STUDENT IN AN ORGANIZED HEALTH CARE EDUCATION/TRAINING PROGRAM
Payer: MEDICARE

## 2020-03-01 VITALS — HEART RATE: 77 BPM | OXYGEN SATURATION: 97 % | TEMPERATURE: 97 F

## 2020-03-01 VITALS
RESPIRATION RATE: 20 BRPM | SYSTOLIC BLOOD PRESSURE: 142 MMHG | WEIGHT: 110.01 LBS | HEIGHT: 65 IN | DIASTOLIC BLOOD PRESSURE: 63 MMHG

## 2020-03-01 LAB
ALBUMIN SERPL ELPH-MCNC: 3.2 G/DL — LOW (ref 3.5–5)
ALP SERPL-CCNC: 106 U/L — SIGNIFICANT CHANGE UP (ref 40–120)
ALT FLD-CCNC: 12 U/L DA — SIGNIFICANT CHANGE UP (ref 10–60)
ANION GAP SERPL CALC-SCNC: 9 MMOL/L — SIGNIFICANT CHANGE UP (ref 5–17)
APTT BLD: 29.7 SEC — SIGNIFICANT CHANGE UP (ref 27.5–36.3)
AST SERPL-CCNC: 13 U/L — SIGNIFICANT CHANGE UP (ref 10–40)
BASOPHILS # BLD AUTO: 0.08 K/UL — SIGNIFICANT CHANGE UP (ref 0–0.2)
BASOPHILS NFR BLD AUTO: 1.3 % — SIGNIFICANT CHANGE UP (ref 0–2)
BILIRUB SERPL-MCNC: 0.4 MG/DL — SIGNIFICANT CHANGE UP (ref 0.2–1.2)
BUN SERPL-MCNC: 31 MG/DL — HIGH (ref 7–18)
CALCIUM SERPL-MCNC: 8.9 MG/DL — SIGNIFICANT CHANGE UP (ref 8.4–10.5)
CHLORIDE SERPL-SCNC: 106 MMOL/L — SIGNIFICANT CHANGE UP (ref 96–108)
CO2 SERPL-SCNC: 26 MMOL/L — SIGNIFICANT CHANGE UP (ref 22–31)
CREAT SERPL-MCNC: 1.25 MG/DL — SIGNIFICANT CHANGE UP (ref 0.5–1.3)
EOSINOPHIL # BLD AUTO: 0.05 K/UL — SIGNIFICANT CHANGE UP (ref 0–0.5)
EOSINOPHIL NFR BLD AUTO: 0.8 % — SIGNIFICANT CHANGE UP (ref 0–6)
ETHANOL SERPL-MCNC: 55 MG/DL — HIGH (ref 0–10)
GLUCOSE SERPL-MCNC: 101 MG/DL — HIGH (ref 70–99)
HCT VFR BLD CALC: 37.4 % — SIGNIFICANT CHANGE UP (ref 34.5–45)
HGB BLD-MCNC: 11.6 G/DL — SIGNIFICANT CHANGE UP (ref 11.5–15.5)
IMM GRANULOCYTES NFR BLD AUTO: 0.8 % — SIGNIFICANT CHANGE UP (ref 0–1.5)
INR BLD: 0.97 RATIO — SIGNIFICANT CHANGE UP (ref 0.88–1.16)
LYMPHOCYTES # BLD AUTO: 1.13 K/UL — SIGNIFICANT CHANGE UP (ref 1–3.3)
LYMPHOCYTES # BLD AUTO: 18.9 % — SIGNIFICANT CHANGE UP (ref 13–44)
MCHC RBC-ENTMCNC: 28.8 PG — SIGNIFICANT CHANGE UP (ref 27–34)
MCHC RBC-ENTMCNC: 31 GM/DL — LOW (ref 32–36)
MCV RBC AUTO: 92.8 FL — SIGNIFICANT CHANGE UP (ref 80–100)
MONOCYTES # BLD AUTO: 0.37 K/UL — SIGNIFICANT CHANGE UP (ref 0–0.9)
MONOCYTES NFR BLD AUTO: 6.2 % — SIGNIFICANT CHANGE UP (ref 2–14)
NEUTROPHILS # BLD AUTO: 4.31 K/UL — SIGNIFICANT CHANGE UP (ref 1.8–7.4)
NEUTROPHILS NFR BLD AUTO: 72 % — SIGNIFICANT CHANGE UP (ref 43–77)
NRBC # BLD: 0 /100 WBCS — SIGNIFICANT CHANGE UP (ref 0–0)
PLATELET # BLD AUTO: 423 K/UL — HIGH (ref 150–400)
POTASSIUM SERPL-MCNC: 3.6 MMOL/L — SIGNIFICANT CHANGE UP (ref 3.5–5.3)
POTASSIUM SERPL-SCNC: 3.6 MMOL/L — SIGNIFICANT CHANGE UP (ref 3.5–5.3)
PROT SERPL-MCNC: 7.6 G/DL — SIGNIFICANT CHANGE UP (ref 6–8.3)
PROTHROM AB SERPL-ACNC: 10.7 SEC — SIGNIFICANT CHANGE UP (ref 10–12.9)
RBC # BLD: 4.03 M/UL — SIGNIFICANT CHANGE UP (ref 3.8–5.2)
RBC # FLD: 13.6 % — SIGNIFICANT CHANGE UP (ref 10.3–14.5)
SODIUM SERPL-SCNC: 141 MMOL/L — SIGNIFICANT CHANGE UP (ref 135–145)
TROPONIN I SERPL-MCNC: <0.015 NG/ML — SIGNIFICANT CHANGE UP (ref 0–0.04)
WBC # BLD: 5.99 K/UL — SIGNIFICANT CHANGE UP (ref 3.8–10.5)
WBC # FLD AUTO: 5.99 K/UL — SIGNIFICANT CHANGE UP (ref 3.8–10.5)

## 2020-03-01 PROCEDURE — 93005 ELECTROCARDIOGRAM TRACING: CPT

## 2020-03-01 PROCEDURE — 99284 EMERGENCY DEPT VISIT MOD MDM: CPT

## 2020-03-01 PROCEDURE — 85730 THROMBOPLASTIN TIME PARTIAL: CPT

## 2020-03-01 PROCEDURE — 85610 PROTHROMBIN TIME: CPT

## 2020-03-01 PROCEDURE — 36415 COLL VENOUS BLD VENIPUNCTURE: CPT

## 2020-03-01 PROCEDURE — 80307 DRUG TEST PRSMV CHEM ANLYZR: CPT

## 2020-03-01 PROCEDURE — 80053 COMPREHEN METABOLIC PANEL: CPT

## 2020-03-01 PROCEDURE — 85027 COMPLETE CBC AUTOMATED: CPT

## 2020-03-01 PROCEDURE — 84484 ASSAY OF TROPONIN QUANT: CPT

## 2020-03-01 PROCEDURE — 82962 GLUCOSE BLOOD TEST: CPT

## 2020-03-01 PROCEDURE — 99285 EMERGENCY DEPT VISIT HI MDM: CPT

## 2020-03-01 NOTE — ED PROVIDER NOTE - PROGRESS NOTE DETAILS
patient lab wnl. remains neuro intact in ed. asya endorses patient appears baseline. instructed to f.u pmd, return precautoon provided

## 2020-03-01 NOTE — ED PROVIDER NOTE - PATIENT PORTAL LINK FT
You can access the FollowMyHealth Patient Portal offered by [facility] by registering at the following website: [email or web address]. By joining [facility]’s FollowMyHealth portal, you will also be able to view your health information using other applications (apps) compatible with our system.

## 2020-03-01 NOTE — ED PROVIDER NOTE - OBJECTIVE STATEMENT
89 y/o F pt with no significant PMHx and no significant PSHx BIB daughter after finding her sleeping and difficult to arouse today. Daughter endorses that pt was drinking with sisters prior to difficulty of arousal. Upon arrival, pt alert and oriented and per daughter is acting like her nml self. Pt denies LOC, syncope, falls, injury, chest pain, SOB, or any other acute complaints. NKDA.

## 2020-03-01 NOTE — ED ADULT NURSE NOTE - NSIMPLEMENTINTERV_GEN_ALL_ED
Implemented All Fall Risk Interventions:  [place] to call system. Call bell, personal items and telephone within reach. Instruct patient to call for assistance. Room bathroom lighting operational. Non-slip footwear when patient is off stretcher. Physically safe environment: no spills, clutter or unnecessary equipment. Stretcher in lowest position, wheels locked, appropriate side rails in place. Provide visual cue, wrist band, yellow gown, etc. Monitor gait and stability. Monitor for mental status changes and reorient to person, place, and time. Review medications for side effects contributing to fall risk. Reinforce activity limits and safety measures with patient and family.

## 2020-03-01 NOTE — ED PROVIDER NOTE - CLINICAL SUMMARY MEDICAL DECISION MAKING FREE TEXT BOX
patient presenting for concern for syncope, though history consistent with alcohol intox. no signs of head trauma. will obtain lab, ekg, assess for acs, alcohol level, ed obs and reassess

## 2021-06-03 ENCOUNTER — INPATIENT (INPATIENT)
Facility: HOSPITAL | Age: 86
LOS: 5 days | Discharge: NURSING, REHAB OR LONG-TERM CARE | DRG: 441 | End: 2021-06-09
Attending: INTERNAL MEDICINE | Admitting: INTERNAL MEDICINE
Payer: MEDICARE

## 2021-06-03 VITALS
TEMPERATURE: 98 F | WEIGHT: 130.07 LBS | RESPIRATION RATE: 16 BRPM | OXYGEN SATURATION: 95 % | HEIGHT: 65 IN | DIASTOLIC BLOOD PRESSURE: 55 MMHG | SYSTOLIC BLOOD PRESSURE: 151 MMHG | HEART RATE: 70 BPM

## 2021-06-03 DIAGNOSIS — D64.9 ANEMIA, UNSPECIFIED: ICD-10-CM

## 2021-06-03 DIAGNOSIS — R74.01 ELEVATION OF LEVELS OF LIVER TRANSAMINASE LEVELS: ICD-10-CM

## 2021-06-03 DIAGNOSIS — K86.89 OTHER SPECIFIED DISEASES OF PANCREAS: ICD-10-CM

## 2021-06-03 DIAGNOSIS — Z29.9 ENCOUNTER FOR PROPHYLACTIC MEASURES, UNSPECIFIED: ICD-10-CM

## 2021-06-03 DIAGNOSIS — R17 UNSPECIFIED JAUNDICE: ICD-10-CM

## 2021-06-03 LAB
ALBUMIN SERPL ELPH-MCNC: 2.2 G/DL — LOW (ref 3.5–5)
ALP SERPL-CCNC: 1326 U/L — HIGH (ref 40–120)
ALT FLD-CCNC: 220 U/L DA — HIGH (ref 10–60)
ANION GAP SERPL CALC-SCNC: 11 MMOL/L — SIGNIFICANT CHANGE UP (ref 5–17)
AST SERPL-CCNC: 175 U/L — HIGH (ref 10–40)
BASOPHILS # BLD AUTO: 0 K/UL — SIGNIFICANT CHANGE UP (ref 0–0.2)
BASOPHILS NFR BLD AUTO: 0 % — SIGNIFICANT CHANGE UP (ref 0–2)
BILIRUB DIRECT SERPL-MCNC: 20.7 MG/DL — HIGH (ref 0–0.2)
BILIRUB SERPL-MCNC: 23.3 MG/DL — HIGH (ref 0.2–1.2)
BUN SERPL-MCNC: 26 MG/DL — HIGH (ref 7–18)
CALCIUM SERPL-MCNC: 9 MG/DL — SIGNIFICANT CHANGE UP (ref 8.4–10.5)
CHLORIDE SERPL-SCNC: 100 MMOL/L — SIGNIFICANT CHANGE UP (ref 96–108)
CO2 SERPL-SCNC: 23 MMOL/L — SIGNIFICANT CHANGE UP (ref 22–31)
CREAT SERPL-MCNC: 1.17 MG/DL — SIGNIFICANT CHANGE UP (ref 0.5–1.3)
EOSINOPHIL # BLD AUTO: 0.09 K/UL — SIGNIFICANT CHANGE UP (ref 0–0.5)
EOSINOPHIL NFR BLD AUTO: 1 % — SIGNIFICANT CHANGE UP (ref 0–6)
GLUCOSE SERPL-MCNC: 119 MG/DL — HIGH (ref 70–99)
HCT VFR BLD CALC: 28.7 % — LOW (ref 34.5–45)
HGB BLD-MCNC: 9.7 G/DL — LOW (ref 11.5–15.5)
LIDOCAIN IGE QN: 167 U/L — SIGNIFICANT CHANGE UP (ref 73–393)
LYMPHOCYTES # BLD AUTO: 0.96 K/UL — LOW (ref 1–3.3)
LYMPHOCYTES # BLD AUTO: 11 % — LOW (ref 13–44)
MCHC RBC-ENTMCNC: 30.9 PG — SIGNIFICANT CHANGE UP (ref 27–34)
MCHC RBC-ENTMCNC: 33.8 GM/DL — SIGNIFICANT CHANGE UP (ref 32–36)
MCV RBC AUTO: 91.4 FL — SIGNIFICANT CHANGE UP (ref 80–100)
MONOCYTES # BLD AUTO: 0.61 K/UL — SIGNIFICANT CHANGE UP (ref 0–0.9)
MONOCYTES NFR BLD AUTO: 7 % — SIGNIFICANT CHANGE UP (ref 2–14)
NEUTROPHILS # BLD AUTO: 7.05 K/UL — SIGNIFICANT CHANGE UP (ref 1.8–7.4)
NEUTROPHILS NFR BLD AUTO: 79 % — HIGH (ref 43–77)
PLATELET # BLD AUTO: 386 K/UL — SIGNIFICANT CHANGE UP (ref 150–400)
POTASSIUM SERPL-MCNC: 4.3 MMOL/L — SIGNIFICANT CHANGE UP (ref 3.5–5.3)
POTASSIUM SERPL-SCNC: 4.3 MMOL/L — SIGNIFICANT CHANGE UP (ref 3.5–5.3)
PROT SERPL-MCNC: 6.5 G/DL — SIGNIFICANT CHANGE UP (ref 6–8.3)
RBC # BLD: 3.14 M/UL — LOW (ref 3.8–5.2)
RBC # FLD: 22.3 % — HIGH (ref 10.3–14.5)
SARS-COV-2 RNA SPEC QL NAA+PROBE: SIGNIFICANT CHANGE UP
SODIUM SERPL-SCNC: 134 MMOL/L — LOW (ref 135–145)
WBC # BLD: 8.7 K/UL — SIGNIFICANT CHANGE UP (ref 3.8–10.5)
WBC # FLD AUTO: 8.7 K/UL — SIGNIFICANT CHANGE UP (ref 3.8–10.5)

## 2021-06-03 PROCEDURE — 99285 EMERGENCY DEPT VISIT HI MDM: CPT

## 2021-06-03 PROCEDURE — 71045 X-RAY EXAM CHEST 1 VIEW: CPT | Mod: 26

## 2021-06-03 PROCEDURE — 74177 CT ABD & PELVIS W/CONTRAST: CPT | Mod: 26

## 2021-06-03 PROCEDURE — 93010 ELECTROCARDIOGRAM REPORT: CPT

## 2021-06-03 RX ORDER — ONDANSETRON 8 MG/1
4 TABLET, FILM COATED ORAL EVERY 6 HOURS
Refills: 0 | Status: DISCONTINUED | OUTPATIENT
Start: 2021-06-03 | End: 2021-06-09

## 2021-06-03 RX ORDER — HEPARIN SODIUM 5000 [USP'U]/ML
5000 INJECTION INTRAVENOUS; SUBCUTANEOUS EVERY 8 HOURS
Refills: 0 | Status: DISCONTINUED | OUTPATIENT
Start: 2021-06-03 | End: 2021-06-09

## 2021-06-03 NOTE — H&P ADULT - PROBLEM SELECTOR PLAN 2
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

## 2021-06-03 NOTE — H&P ADULT - ATTENDING COMMENTS
89 y/o female with no significant PMHx presents to ED c/o weakness. Patient notes jaundice and denies other complaints, states feeling well. Patient notes skin change x3 days ago. Patient notes home attendant at home with daughter nearby. Patient denies fever and chills. No other known complaints. NKDA    assessment  --  painless jaundice, liver failure, anemia    plan  --  admit to med, cont preadmit home meds, gi and dvt profilaxis,  cbc, bmp, mg, phos, lipids, tsh, bld cx, ua, ucx, lft, acute hepatitis panel, ptt, inr, anemia panel, ca 19-9, afp, haptoglobin    ct abd pelv    hepatology cons  gi cons

## 2021-06-03 NOTE — ED PROVIDER NOTE - OBJECTIVE STATEMENT
89 y/o female with no significant PMHx presents to ED c/o weakness. Patient notes jaundice and denies other complaints, states feeling well. Patient notes skin change x3 days ago. Patient notes home attendant at home with daughter nearby. Patient denies fever and chills. No other known complaints. NKDA.

## 2021-06-03 NOTE — H&P ADULT - PROBLEM SELECTOR PLAN 1
patient presented due to jaundice of skin and eyes  noted to have elevated LFTs on admission   CT abdomen showing infiltrating relatively hyperenhancing mass in the pancreatic head, extending to the pancreatic duodenal groove, with resulting severe intra and extrahepatic biliary dilatation. Prominently enhancing peripancreatic lymph node  GI, Dr. Garcia consulted  hepatology, Dr. Nuñez consulted   surgical oncology, Dr. Marrero consulted   f/u Ca 19.9, CEA, AFP, hepatitis panel   monitor CMP daily

## 2021-06-03 NOTE — H&P ADULT - ASSESSMENT
Patient is a 89 year old female from home AAO x3, with PMH of Left eye blindness, who presented to the ED due to jaundice and weakness.  Patient is a 89 year old female from home AAO x3, with PMH of Left eye blindness, who presented to the ED due to jaundice and weakness.     Total bilirubin of 23.3. Alk phos of 1326. AST/ALT of 175/220. Hgb of 9.7. Direct bilirubin of 20.7. COVID negative. CXR clear. CT abdomen done showing infiltrating relatively hyperenhancing mass in the pancreatic head, extending to the pancreatic duodenal groove, with resulting severe intra and extrahepatic biliary dilatation. Prominently enhancing peripancreatic lymph node.

## 2021-06-03 NOTE — H&P ADULT - NSHPSOCIALHISTORY_GEN_ALL_CORE
Patient lives alone at home. Denies any smoking or illicit drug use. Occasional alcohol use. States she drinks jenn once a week.

## 2021-06-03 NOTE — H&P ADULT - HISTORY OF PRESENT ILLNESS
Patient is a 89 year old female from home AAO x3, with PMH of Left eye blindness, who presented to the ED due to jaundice and weakness.  Patient is a 89 year old female from home AAO x3, with PMH of Left eye blindness, who presented to the ED due to jaundice and weakness. Patient states that she noticed her skin and eyes were yellow about 3 days ago. Patient states that prior to that her skin and eyes were fine. Patient also states having some weakness for the past few days as well. Patient denies any history of any medical issues other than some left eye blindness. Denies any pain, nausea, vomiting, dizziness or headache. Spoke to daughter, Genny ([phone]) for further history. Daughter states that the patient lives alone at home but the daughter lives near by. Patient has a HHA 3days/week. Daughter states she had noticed patient's skin was yellow on Sunday. HHA also mentioned to her that they noticed her skin was yellow as well. Daughter states that the patient denied any other concerns or complaints to her.

## 2021-06-03 NOTE — ED ADULT NURSE NOTE - NSIMPLEMENTINTERV_GEN_ALL_ED
Implemented All Fall with Harm Risk Interventions:  [place] to call system. Call bell, personal items and telephone within reach. Instruct patient to call for assistance. Room bathroom lighting operational. Non-slip footwear when patient is off stretcher. Physically safe environment: no spills, clutter or unnecessary equipment. Stretcher in lowest position, wheels locked, appropriate side rails in place. Provide visual cue, wrist band, yellow gown, etc. Monitor gait and stability. Monitor for mental status changes and reorient to person, place, and time. Review medications for side effects contributing to fall risk. Reinforce activity limits and safety measures with patient and family. Provide visual clues: red socks.

## 2021-06-03 NOTE — CONSULT NOTE ADULT - SUBJECTIVE AND OBJECTIVE BOX
HPI:  Patient is a 89 year old female from home AAO x3, with PMH of Left eye blindness, who presented to the ED due to jaundice and weakness. Patient states that she noticed her skin and eyes were yellow about 3 days ago. Patient states that prior to that her skin and eyes were fine. Patient also states having some weakness for the past few days as well. Patient denies any history of any medical issues other than some left eye blindness. Denies any pain, nausea, vomiting, dizziness or headache. Spoke to daughter, Genny ([phone]) for further history. Daughter states that the patient lives alone at home but the daughter lives near by. Patient has a HHA 3days/week. Daughter states she had noticed patient's skin was yellow on Sunday. HHA also mentioned to her that they noticed her skin was yellow as well. Daughter states that the patient denied any other concerns or complaints to her.  (03 Jun 2021 17:14)    Patient seen and examined at bedside for surgical consult due to obstructive jaundice from large pancreatic head mass. Admits to taking "pills" daily but does not recall what they are, denies surgical history. States her skin recently turned yellow but that it has not been long. Admits to weakness and falling once at home without loss of consciousness, states she fell onto couch. She states she ate something that was old which is causing her weakness. Denies abdominal pain, nausea, vomiting, anorexia, constipation and difficulty urinating. Daughter discussed GOC with medical team and is present at bedside, requests that we not tell her mother the diagnosis tonight and that she believes her mother will not want surgical intervention of any kind. MOLST filled out by medicine and family.     PAST MEDICAL & SURGICAL HISTORY:  No pertinent past medical history  No significant past surgical history    Review of Systems: Contianed within HPI    MEDICATIONS  (STANDING):  heparin   Injectable 5000 Unit(s) SubCutaneous every 8 hours    MEDICATIONS  (PRN):  ondansetron Injectable 4 milliGRAM(s) IV Push every 6 hours PRN Nausea and/or Vomiting    Allergies: No Known Allergies    FAMILY HISTORY:    Vital Signs Last 24 Hrs  T(C): 36.7 (03 Jun 2021 19:12), Max: 36.7 (03 Jun 2021 11:00)  T(F): 98 (03 Jun 2021 19:12), Max: 98 (03 Jun 2021 11:00)  HR: 49 (03 Jun 2021 19:12) (49 - 82)  BP: 178/70 (03 Jun 2021 19:12) (151/55 - 178/70)  RR: 18 (03 Jun 2021 19:12) (16 - 18)  SpO2: 98% (03 Jun 2021 19:12) (95% - 99%)    Physical Exam:    General:  Appears stated age, well-groomed, well-nourished, no distress, severe jaundice  Eyes: EOMI, icteric  HENT:  WNL, no JVD  Chest: respirations nonlabored  Cardiovascular:  Regular rate & rhythm  Abdomen: soft, nontender to palpation, nondistended  Extremities: no edema bilaterally  Skin: warm and dry  Musculoskeletal: no calf tenderness  Neuro:  Alert, oriented to time, place and person   Psych: normal affect    LABS:                        9.7    8.70  )-----------( 386      ( 03 Jun 2021 12:42 )             28.7     06-03    134<L>  |  100  |  26<H>  ----------------------------<  119<H>  4.3   |  23  |  1.17    Ca    9.0      03 Jun 2021 12:42    TPro  6.5  /  Alb  2.2<L>  /  TBili  23.3<H>  /  DBili  20.7<H>  /  AST  175<H>  /  ALT  220<H>  /  AlkPhos  1326<H>  06-03    RADIOLOGY & ADDITIONAL STUDIES:  < from: CT Abdomen and Pelvis w/ IV Cont (06.03.21 @ 17:28) >  EXAM:  CT ABDOMEN AND PELVIS IC                          PROCEDURE DATE:  06/03/2021      INTERPRETATION:  CLINICAL INFORMATION: Jaundice.    COMPARISON: None.    PROCEDURE:  CT of the Abdomen and Pelvis was performed without oral contrast and with Omnipaque intravenous contrast.  Sagittal and coronal reformats were performed.    FINDINGS:  LOWER CHEST: Within normal limits.    LIVER: There is a lobular cyst in the right lobe of the liver inferiorly and a lobular cyst in the left lobe laterally. The hepatic veins and portal vein are patent.  BILE DUCTS: Severe intra and extrahepatic biliary duct dilatation extending to the pancreatic head. The common bile duct measures up to 2.6 cm in diameter.  GALLBLADDER: Distended with mild uniform wall enhancement.  SPLEEN: Within normal limits.  PANCREAS: There is an irregular enhancing 1.9 x 1.9 cm relatively hyperenhancing mass in the pancreatic head, extending along the pancreaticoduodenal groove, with abrupt narrowing of the common bile duct at the level of the lesion. There is subtle prominence of the upstream pancreatic duct. There is no evidence of involvement of the portal vein, SMV, or SMA. There is a prominently enhancing peripancreatic lymph node measuring 11 x 8 mm on image 59. The pancreatic body and tail are moderately atrophic. There is severe fatty replacement of the uncinate process of the pancreas.  ADRENALS: Within normal limits.  KIDNEYS/URETERS: The left kidney is mildly atrophic with scarring in the upper pole. There are tiny left renal cysts. No evidence of hydronephrosis, mass, stone, or perinephric stranding bilaterally.    BLADDER: Within normal limits.  REPRODUCTIVE ORGANS: Uterus and adnexa within normal limits.    BOWEL: Pills are noted in the stomach. There is moderate concentric low-attenuation wall thickening of the gastric antrum and pylorus with prominent mucosal enhancement, suggestive of gastritis. Extensive diverticulosis of the sigmoid colon. No bowel obstruction. Appendix is normal.  PERITONEUM: No ascites.  VESSELS: Within normal limits.  RETROPERITONEUM/LYMPH NODES: No lymphadenopathy.  ABDOMINAL WALL: Within normal limits.  BONES: Status post left total hip replacement. Severe degenerative changes of the right hip. Healed left superior and inferior pubic rami fractures. Marked loss of bone mineral density, compatible with osteoporosis. Chronic-appearing compression deformities of the thoracic and lumbar spine.    IMPRESSION: Infiltrating relatively hyperenhancing mass in the pancreatic head, extending to the pancreatic duodenal groove, with resulting severe intra and extrahepatic biliary dilatation. Prominently enhancing peripancreatic lymph node. Differential considerations include cholangiocarcinoma and primary neuroendocrine tumor. Pancreatic adenocarcinoma is considered less likely. Recommend ERCP for stent placement and tissue sampling.    ISIDRO TIJERINA M.D., ATTENDING RADIOLOGIST  This document has been electronically signed. Sriram  3 2021  5:45PM    < end of copied text >

## 2021-06-03 NOTE — H&P ADULT - PROBLEM SELECTOR PLAN 3
elevated LFTs with AST/ALT of 175/220  likely secondary to mass noted on CT abdomen   monitor CMP daily   hepatology, Dr. Nuñez consulted

## 2021-06-03 NOTE — CHART NOTE - NSCHARTNOTEFT_GEN_A_CORE
Discussed CT findings with daughter, Genyn at bedside. Daughter states that patient would not want surgical intervention. Stated patient is DNR/DNI. MOLST form drafted and placed in chart. Palliative care team consulted.

## 2021-06-04 DIAGNOSIS — E43 UNSPECIFIED SEVERE PROTEIN-CALORIE MALNUTRITION: ICD-10-CM

## 2021-06-04 DIAGNOSIS — R53.81 OTHER MALAISE: ICD-10-CM

## 2021-06-04 DIAGNOSIS — Z51.5 ENCOUNTER FOR PALLIATIVE CARE: ICD-10-CM

## 2021-06-04 LAB
24R-OH-CALCIDIOL SERPL-MCNC: 37.2 NG/ML — SIGNIFICANT CHANGE UP (ref 30–80)
A1C WITH ESTIMATED AVERAGE GLUCOSE RESULT: 4.7 % — SIGNIFICANT CHANGE UP (ref 4–5.6)
AFP-TM SERPL-MCNC: 15.4 NG/ML — HIGH
ALBUMIN SERPL ELPH-MCNC: 2.1 G/DL — LOW (ref 3.5–5)
ALP SERPL-CCNC: 1316 U/L — HIGH (ref 40–120)
ALT FLD-CCNC: 219 U/L DA — HIGH (ref 10–60)
ANION GAP SERPL CALC-SCNC: 10 MMOL/L — SIGNIFICANT CHANGE UP (ref 5–17)
APTT BLD: 28.7 SEC — SIGNIFICANT CHANGE UP (ref 27.5–35.5)
AST SERPL-CCNC: 202 U/L — HIGH (ref 10–40)
BASOPHILS # BLD AUTO: 0.1 K/UL — SIGNIFICANT CHANGE UP (ref 0–0.2)
BASOPHILS NFR BLD AUTO: 1 % — SIGNIFICANT CHANGE UP (ref 0–2)
BILIRUB DIRECT SERPL-MCNC: 19.9 MG/DL — HIGH (ref 0–0.2)
BILIRUB SERPL-MCNC: 23 MG/DL — HIGH (ref 0.2–1.2)
BUN SERPL-MCNC: 27 MG/DL — HIGH (ref 7–18)
CALCIUM SERPL-MCNC: 9.2 MG/DL — SIGNIFICANT CHANGE UP (ref 8.4–10.5)
CANCER AG19-9 SERPL-ACNC: 79 U/ML — HIGH
CEA SERPL-MCNC: 3.4 NG/ML — SIGNIFICANT CHANGE UP (ref 0–3.8)
CHLORIDE SERPL-SCNC: 103 MMOL/L — SIGNIFICANT CHANGE UP (ref 96–108)
CHOLEST SERPL-MCNC: 525 MG/DL — HIGH
CO2 SERPL-SCNC: 23 MMOL/L — SIGNIFICANT CHANGE UP (ref 22–31)
COVID-19 SPIKE DOMAIN AB INTERP: NEGATIVE — SIGNIFICANT CHANGE UP
COVID-19 SPIKE DOMAIN ANTIBODY RESULT: 0.4 U/ML — SIGNIFICANT CHANGE UP
CREAT SERPL-MCNC: 1.25 MG/DL — SIGNIFICANT CHANGE UP (ref 0.5–1.3)
EOSINOPHIL # BLD AUTO: 0.15 K/UL — SIGNIFICANT CHANGE UP (ref 0–0.5)
EOSINOPHIL NFR BLD AUTO: 1.6 % — SIGNIFICANT CHANGE UP (ref 0–6)
ESTIMATED AVERAGE GLUCOSE: 88 MG/DL — SIGNIFICANT CHANGE UP (ref 68–114)
FERRITIN SERPL-MCNC: 617 NG/ML — HIGH (ref 15–150)
FOLATE SERPL-MCNC: 11.9 NG/ML — SIGNIFICANT CHANGE UP
GLUCOSE BLDC GLUCOMTR-MCNC: 150 MG/DL — HIGH (ref 70–99)
GLUCOSE BLDC GLUCOMTR-MCNC: 159 MG/DL — HIGH (ref 70–99)
GLUCOSE SERPL-MCNC: 114 MG/DL — HIGH (ref 70–99)
HAPTOGLOB SERPL-MCNC: 198 MG/DL — SIGNIFICANT CHANGE UP (ref 34–200)
HCT VFR BLD CALC: 28.1 % — LOW (ref 34.5–45)
HDLC SERPL-MCNC: 9 MG/DL — LOW
HGB BLD-MCNC: 9.4 G/DL — LOW (ref 11.5–15.5)
IMM GRANULOCYTES NFR BLD AUTO: 1.3 % — SIGNIFICANT CHANGE UP (ref 0–1.5)
INR BLD: 0.95 RATIO — SIGNIFICANT CHANGE UP (ref 0.88–1.16)
IRON SATN MFR SERPL: 19 % — SIGNIFICANT CHANGE UP (ref 15–50)
IRON SATN MFR SERPL: 43 UG/DL — SIGNIFICANT CHANGE UP (ref 40–150)
LIPID PNL WITH DIRECT LDL SERPL: 480 MG/DL — HIGH
LYMPHOCYTES # BLD AUTO: 1 K/UL — SIGNIFICANT CHANGE UP (ref 1–3.3)
LYMPHOCYTES # BLD AUTO: 10.4 % — LOW (ref 13–44)
MAGNESIUM SERPL-MCNC: 2.4 MG/DL — SIGNIFICANT CHANGE UP (ref 1.6–2.6)
MCHC RBC-ENTMCNC: 30.4 PG — SIGNIFICANT CHANGE UP (ref 27–34)
MCHC RBC-ENTMCNC: 33.5 GM/DL — SIGNIFICANT CHANGE UP (ref 32–36)
MCV RBC AUTO: 90.9 FL — SIGNIFICANT CHANGE UP (ref 80–100)
MONOCYTES # BLD AUTO: 0.62 K/UL — SIGNIFICANT CHANGE UP (ref 0–0.9)
MONOCYTES NFR BLD AUTO: 6.5 % — SIGNIFICANT CHANGE UP (ref 2–14)
NEUTROPHILS # BLD AUTO: 7.58 K/UL — HIGH (ref 1.8–7.4)
NEUTROPHILS NFR BLD AUTO: 79.2 % — HIGH (ref 43–77)
NON HDL CHOLESTEROL: 516 MG/DL — HIGH
NRBC # BLD: 0 /100 WBCS — SIGNIFICANT CHANGE UP (ref 0–0)
PHOSPHATE SERPL-MCNC: 3 MG/DL — SIGNIFICANT CHANGE UP (ref 2.5–4.5)
PLATELET # BLD AUTO: 376 K/UL — SIGNIFICANT CHANGE UP (ref 150–400)
POTASSIUM SERPL-MCNC: 3.6 MMOL/L — SIGNIFICANT CHANGE UP (ref 3.5–5.3)
POTASSIUM SERPL-SCNC: 3.6 MMOL/L — SIGNIFICANT CHANGE UP (ref 3.5–5.3)
PROT SERPL-MCNC: 5.9 G/DL — LOW (ref 6–8.3)
PROTHROM AB SERPL-ACNC: 11.3 SEC — SIGNIFICANT CHANGE UP (ref 10.6–13.6)
RBC # BLD: 3.09 M/UL — LOW (ref 3.8–5.2)
RBC # FLD: 21.9 % — HIGH (ref 10.3–14.5)
SARS-COV-2 IGG+IGM SERPL QL IA: 0.4 U/ML — SIGNIFICANT CHANGE UP
SARS-COV-2 IGG+IGM SERPL QL IA: NEGATIVE — SIGNIFICANT CHANGE UP
SODIUM SERPL-SCNC: 136 MMOL/L — SIGNIFICANT CHANGE UP (ref 135–145)
TIBC SERPL-MCNC: 229 UG/DL — LOW (ref 250–450)
TRIGL SERPL-MCNC: 178 MG/DL — HIGH
UIBC SERPL-MCNC: 186 UG/DL — SIGNIFICANT CHANGE UP (ref 110–370)
VIT B12 SERPL-MCNC: 1134 PG/ML — SIGNIFICANT CHANGE UP (ref 232–1245)
WBC # BLD: 9.57 K/UL — SIGNIFICANT CHANGE UP (ref 3.8–10.5)
WBC # FLD AUTO: 9.57 K/UL — SIGNIFICANT CHANGE UP (ref 3.8–10.5)

## 2021-06-04 PROCEDURE — 99221 1ST HOSP IP/OBS SF/LOW 40: CPT

## 2021-06-04 PROCEDURE — 99497 ADVNCD CARE PLAN 30 MIN: CPT | Mod: 25

## 2021-06-04 PROCEDURE — 99223 1ST HOSP IP/OBS HIGH 75: CPT

## 2021-06-04 RX ADMIN — HEPARIN SODIUM 5000 UNIT(S): 5000 INJECTION INTRAVENOUS; SUBCUTANEOUS at 21:05

## 2021-06-04 RX ADMIN — HEPARIN SODIUM 5000 UNIT(S): 5000 INJECTION INTRAVENOUS; SUBCUTANEOUS at 14:45

## 2021-06-04 RX ADMIN — HEPARIN SODIUM 5000 UNIT(S): 5000 INJECTION INTRAVENOUS; SUBCUTANEOUS at 00:06

## 2021-06-04 RX ADMIN — HEPARIN SODIUM 5000 UNIT(S): 5000 INJECTION INTRAVENOUS; SUBCUTANEOUS at 05:58

## 2021-06-04 NOTE — PROGRESS NOTE ADULT - ASSESSMENT
Patient is a 89 year old female from home AAO x3, with PMH of Left eye blindness, who presented to the ED due to jaundice and weakness.     Total bilirubin of 23.3. Alk phos of 1326. AST/ALT of 175/220. Hgb of 9.7. Direct bilirubin of 20.7. COVID negative. CXR clear. CT abdomen done showing infiltrating relatively hyperenhancing mass in the pancreatic head, extending to the pancreatic duodenal groove, with resulting severe intra and extrahepatic biliary dilatation. Prominently enhancing peripancreatic lymph node.

## 2021-06-04 NOTE — CONSULT NOTE ADULT - PROBLEM SELECTOR RECOMMENDATION 3
pt was ambulatory prior to admission.  S/p syncopal episodes x2.  Currently bedbound.  Requires assistance with ADLs.  Skin care per protocol.   PT eval pt was ambulatory prior to admission.  S/p syncopal episodes x2.  Currently more debilitated.   Requires assistance with ADLs.  Skin care per protocol.   PT eval

## 2021-06-04 NOTE — RAPID RESPONSE TEAM SUMMARY - NSSITUATIONBACKGROUNDRRT_GEN_ALL_CORE
89 y/o female admitted to medicine service yesterday for painless jaundice with evidence of pancreatic head mass on CT. RRT activated at 5:30AM on 6/5 after patient was noted to lose consciousness while on toilet, witnessed by PCA chaperone. Patient was carried back to her bed by multiple staff members, where she immediately regained consciousness. She then smiled and thanked all the staff for helping her.    Fingerstick noted to be 159. /80 with HR in 60s and O2 sat of 98% on room air. Patient appears to be DNR/DNI and not interested in aggressive measures. Patient is awaiting palliative care evaluation. At present, she is hemodynamically stable and does not require any intervention. Continue use of chaperone when out of bed for patient's own safety. Patient does not require any additional intervention at this time. Syncope appears to be vasovagal in nature. 89 y/o female admitted to medicine service yesterday for painless jaundice with evidence of pancreatic head mass on CT. RRT activated at 5:30AM on 6/4 after patient was noted to lose consciousness while on toilet, witnessed by PCA chaperone. Patient was carried back to her bed by multiple staff members, where she immediately regained consciousness. She then smiled and thanked all the staff for helping her.    Fingerstick noted to be 159. /80 with HR in 60s and O2 sat of 98% on room air. Patient appears to be DNR/DNI and not interested in aggressive measures. Patient is awaiting palliative care evaluation. At present, she is hemodynamically stable and does not require any intervention. Continue use of chaperone when out of bed for patient's own safety. Patient does not require any additional intervention at this time. Syncope appears to be vasovagal in nature. 89 y/o female admitted to medicine service yesterday for painless jaundice with evidence of pancreatic head mass on CT. RRT activated at 5:30AM on 6/4 after patient was noted to lose consciousness while on toilet, witnessed by PCA chaperone. Patient was carried back to her bed by multiple staff members, where she immediately regained consciousness. Patient never hit the ground. She then smiled and thanked all the staff for helping her. She is back to baseline mental status.    Fingerstick noted to be 159. /80 with HR in 60s and O2 sat of 98% on room air. Patient appears to be DNR/DNI and not interested in aggressive measures. Patient is awaiting palliative care evaluation. At present, she is hemodynamically stable and does not require any intervention. Continue use of chaperone when out of bed for patient's own safety. Patient does not require any additional intervention at this time. Syncope appears to be vasovagal in nature.

## 2021-06-04 NOTE — CONSULT NOTE ADULT - NEGATIVE MUSCULOSKELETAL SYMPTOMS
no muscle weakness/no stiffness/no neck pain/no arm pain L/no arm pain R/no back pain/no leg pain L/no leg pain R

## 2021-06-04 NOTE — PROGRESS NOTE ADULT - ASSESSMENT
89 year old female with obstructive jaundice secondary to large pancreatic head mass, intra and extrahepatic ductal dilation. TBili 23.   Afebrile, no leukocytosis  GOC: DNR/DNI, family considering palliative    - recommend GI consult  - if agreeable would recommend ERCP   - check CA 19-9 and CEA  - continue to trend labs  - palliative consult  - continue medical management  - discuss with Dr. Marrero

## 2021-06-04 NOTE — CONSULT NOTE ADULT - SUBJECTIVE AND OBJECTIVE BOX
HPI:  Patient is a 89 year old female from home AAO x3, with PMH of Left eye blindness, who presented to the ED due to jaundice and weakness. Patient states that she noticed her skin and eyes were yellow about 3 days ago. Patient states that prior to that her skin and eyes were fine. Patient also states having some weakness for the past few days as well. Patient denies any history of any medical issues other than some left eye blindness. Denies any pain, nausea, vomiting, dizziness or headache. Spoke to daughter, Genny ([phone]) for further history. Daughter states that the patient lives alone at home but the daughter lives near by. Patient has a HHA 3days/week. Daughter states she had noticed patient's skin was yellow on Sunday. HHA also mentioned to her that they noticed her skin was yellow as well. Daughter states that the patient denied any other concerns or complaints to her.  (03 Jun 2021 17:14)    Interval hx: Pt [id number] forgetful.  CT abs/pelvis w iv contrast showed severe intra- and extrahepatic biliary duct dilation extending to the pancreatic head, with CBD 2.6 cm, and with an irregular enhancing 1.9x1.9 cm mass in the pancreatic head.     PAST MEDICAL & SURGICAL HISTORY:  No pertinent past medical history    No significant past surgical history        SOCIAL HISTORY:    Admitted from:  home alone has HHA 3 days 4 hours    Mandaen:   Buddhist                                    Surrogate/HCP/Guardian:  Dilip Daugherty (HCP)       Phone#: [phone]    Genny Willow (dtr)  Phone# [phone]    FAMILY HISTORY:  No contributory history  Baseline ADLs (prior to admission): independent    Allergies    No Known Allergies    Intolerances      Present Symptoms:   Dyspnea: denies  Nausea/Vomiting: denies  Loss of appetite: denies  Pain: denies                                     Review of Systems: [All others negative     MEDICATIONS  (STANDING):  heparin   Injectable 5000 Unit(s) SubCutaneous every 8 hours    MEDICATIONS  (PRN):  ondansetron Injectable 4 milliGRAM(s) IV Push every 6 hours PRN Nausea and/or Vomiting      PHYSICAL EXAM:    Vital Signs Last 24 Hrs  T(C): 36.7 (04 Jun 2021 05:07), Max: 36.7 (03 Jun 2021 11:00)  T(F): 98 (04 Jun 2021 05:07), Max: 98 (03 Jun 2021 11:00)  HR: 59 (04 Jun 2021 05:07) (49 - 82)  BP: 104/41 (04 Jun 2021 05:07) (104/41 - 178/70)  BP(mean): --  RR: 16 (04 Jun 2021 05:07) (16 - 18)  SpO2: 98% (04 Jun 2021 05:07) (95% - 99%)    General: Elderly woman. AOX3.  NAD  Karnofsky Performance Score/Palliative Performance Status Version2: 40    %    HEENT: bitemporal wasting. moist mucous membrane  Lungs: unlabored on RA  CV: RRR  GI: soft, non tender  : normal  incontinent  oliguria/anuria  sanabria  Musculoskeletal: normal  weakness  edema             ambulatory  bedbound/wheelchair bound  Skin: normal  pressure ulcers: stage: edema: other:  Neuro: no deficits cognitive impairment dsyphagia/dysarthria paresis: other:  Oral intake ability: unable/only mouth care [minimal moderate full capability]  Diet: [NPO]    LABS:                        9.4    9.57  )-----------( 376      ( 04 Jun 2021 06:51 )             28.1     06-04    136  |  103  |  27<H>  ----------------------------<  114<H>  3.6   |  23  |  1.25    Ca    9.2      04 Jun 2021 06:51  Phos  3.0     06-04  Mg     2.4     06-04    TPro  5.9<L>  /  Alb  2.1<L>  /  TBili  23.0<H>  /  DBili  19.9<H>  /  AST  202<H>  /  ALT  219<H>  /  AlkPhos  1316<H>  06-04        RADIOLOGY & ADDITIONAL STUDIES:    ADVANCE DIRECTIVES:    HPI:  Patient is a 89 year old female from home AAO x3, with PMH of Left eye blindness, who presented to the ED due to jaundice and weakness. Patient states that she noticed her skin and eyes were yellow about 3 days ago. Patient states that prior to that her skin and eyes were fine. Patient also states having some weakness for the past few days as well. Patient denies any history of any medical issues other than some left eye blindness. Denies any pain, nausea, vomiting, dizziness or headache. Spoke to daughter, Genny ([phone]) for further history. Daughter states that the patient lives alone at home but the daughter lives near by. Patient has a HHA 3days/week. Daughter states she had noticed patient's skin was yellow on Sunday. HHA also mentioned to her that they noticed her skin was yellow as well. Daughter states that the patient denied any other concerns or complaints to her.  (03 Jun 2021 17:14)    Interval hx: Pt AOx3.   CT abs/pelvis w iv contrast showed severe intra- and extrahepatic biliary duct dilation extending to the pancreatic head, with CBD 2.6 cm, and with an irregular enhancing 1.9x1.9 cm mass in the pancreatic head. Denies itching, n/v or pain.     PAST MEDICAL & SURGICAL HISTORY:  No pertinent past medical history    No significant past surgical history        SOCIAL HISTORY:    Admitted from:  home alone has HHA 3 days 4 hours    Jehovah's witness:   Rastafarian                                    Surrogate/HCP/Guardian:  Dilip Daugherty (HCP)       Phone#: [phone]    Genny Willow (dtr)  Phone# [phone]    FAMILY HISTORY:  No contributory history  Baseline ADLs (prior to admission): independent    Allergies    No Known Allergies    Intolerances      Present Symptoms:   Dyspnea: denies  Nausea/Vomiting: denies  Loss of appetite: denies  Pain: denies                                     Review of Systems: [All others negative     MEDICATIONS  (STANDING):  heparin   Injectable 5000 Unit(s) SubCutaneous every 8 hours    MEDICATIONS  (PRN):  ondansetron Injectable 4 milliGRAM(s) IV Push every 6 hours PRN Nausea and/or Vomiting      PHYSICAL EXAM:    Vital Signs Last 24 Hrs  T(C): 36.7 (04 Jun 2021 05:07), Max: 36.7 (03 Jun 2021 11:00)  T(F): 98 (04 Jun 2021 05:07), Max: 98 (03 Jun 2021 11:00)  HR: 59 (04 Jun 2021 05:07) (49 - 82)  BP: 104/41 (04 Jun 2021 05:07) (104/41 - 178/70)  BP(mean): --  RR: 16 (04 Jun 2021 05:07) (16 - 18)  SpO2: 98% (04 Jun 2021 05:07) (95% - 99%)    General: Elderly woman. AOX3.  NAD  Karnofsky Performance Score/Palliative Performance Status Version2: 40    %    HEENT: bitemporal wasting. left eye blind, scleral icterus,   moist mucous membrane  Lungs: unlabored on RA  CV: RRR  GI: soft, non tender on palpation  : urinating  Musculoskeletal: ambulatory, weakness, no edema  Skin: jaundice, no rash or lesions noted  Neuro: AOX3.  able to follow commands  Oral intake ability: good po intake      LABS:                        9.4    9.57  )-----------( 376      ( 04 Jun 2021 06:51 )             28.1     06-04    136  |  103  |  27<H>  ----------------------------<  114<H>  3.6   |  23  |  1.25    Ca    9.2      04 Jun 2021 06:51  Phos  3.0     06-04  Mg     2.4     06-04    TPro  5.9<L>  /  Alb  2.1<L>  /  TBili  23.0<H>  /  DBili  19.9<H>  /  AST  202<H>  /  ALT  219<H>  /  AlkPhos  1316<H>  06-04    < from: CT Abdomen and Pelvis w/ IV Cont (06.03.21 @ 17:28) >  EXAM:  CT ABDOMEN AND PELVIS IC                            PROCEDURE DATE:  06/03/2021          INTERPRETATION:  CLINICAL INFORMATION: Jaundice.    COMPARISON: None.    PROCEDURE:  CT of the Abdomen and Pelvis was performed without oral contrast and with Omnipaque intravenous contrast.  Sagittal and coronal reformats were performed.    FINDINGS:  LOWER CHEST: Within normal limits.    LIVER: There is a lobular cyst in the right lobe of the liver inferiorly and a lobular cyst in the left lobe laterally. The hepatic veins and portal vein are patent.  BILE DUCTS: Severe intra and extrahepatic biliary duct dilatation extending to the pancreatic head. The common bile duct measures up to 2.6 cm in diameter.  GALLBLADDER: Distended with mild uniform wall enhancement.  SPLEEN: Within normal limits.  PANCREAS: There is an irregular enhancing 1.9 x 1.9 cm relatively hyperenhancing mass in the pancreatic head, extending along the pancreaticoduodenal groove, with abrupt narrowing of the common bile duct at the level of the lesion. There is subtle prominence of the upstream pancreatic duct. There is no evidence of involvement of the portal vein, SMV, or SMA. There is a prominently enhancing peripancreatic lymph node measuring 11 x 8 mm on image 59. The pancreatic body and tail are moderately atrophic. There is severe fatty replacement of the uncinate process of the pancreas.  ADRENALS: Within normal limits.  KIDNEYS/URETERS: The left kidney is mildly atrophic with scarring in the upper pole. There are tiny left renal cysts. No evidence of hydronephrosis, mass, stone, or perinephric stranding bilaterally.    BLADDER: Within normal limits.  REPRODUCTIVE ORGANS: Uterus and adnexa within normal limits.    BOWEL: Pills are noted in the stomach. There is moderate concentric low-attenuation wall thickening of the gastric antrum and pylorus with prominent mucosal enhancement, suggestive of gastritis. Extensive diverticulosis of the sigmoid colon. No bowel obstruction. Appendix is normal.  PERITONEUM: No ascites.  VESSELS: Within normal limits.  RETROPERITONEUM/LYMPH NODES: No lymphadenopathy.  ABDOMINAL WALL: Within normal limits.  BONES: Status post left total hip replacement. Severe degenerative changes of the right hip. Healed left superior and inferior pubic rami fractures. Marked loss of bone mineral density, compatible with osteoporosis. Chronic-appearing compression deformities of the thoracic and lumbar spine.    IMPRESSION: Infiltrating relatively hyperenhancing mass in the pancreatic head, extending to the pancreatic duodenal groove, with resulting severe intra and extrahepatic biliary dilatation. Prominently enhancing peripancreatic lymph node. Differential considerations include cholangiocarcinoma and primary neuroendocrine tumor. Pancreatic adenocarcinoma is considered less likely. Recommend ERCP for stent placement and tissue sampling.    < end of copied text >      RADIOLOGY & ADDITIONAL STUDIES: Reviewed    ADVANCE DIRECTIVES: MOLST; DNR/DNI/no feeding tube

## 2021-06-04 NOTE — PROGRESS NOTE ADULT - SUBJECTIVE AND OBJECTIVE BOX
Patient seen and examined at bedside with no complaints.   Denies pain, nausea/ vomiting.   Tolerating diet.    Vital Signs Last 24 Hrs  T(F): 98 (06-04-21 @ 05:07), Max: 98 (06-03-21 @ 11:00)  HR: 59 (06-04-21 @ 05:07)  BP: 104/41 (06-04-21 @ 05:07)  RR: 16 (06-04-21 @ 05:07)  SpO2: 98% (06-04-21 @ 05:07)  POCT Blood Glucose.: 159 mg/dL (04 Jun 2021 05:43)    GENERAL: Alert, NAD, jaundice  CHEST/LUNG: respirations nonlabored  ABDOMEN: soft, Nontender, Nondistended  EXTREMITIES:  no calf tenderness, No edema    I&O's Detail    LABS:                        9.4    9.57  )-----------( 376      ( 04 Jun 2021 06:51 )             28.1     06-04    136  |  103  |  27<H>  ----------------------------<  114<H>  3.6   |  23  |  1.25    Ca    9.2      04 Jun 2021 06:51  Phos  3.0     06-04  Mg     2.4     06-04    TPro  5.9<L>  /  Alb  2.1<L>  /  TBili  23.0<H>  /  DBili  19.9<H>  /  AST  202<H>  /  ALT  219<H>  /  AlkPhos  1316<H>  06-04    PT/INR - ( 04 Jun 2021 06:51 )   PT: 11.3 sec;   INR: 0.95 ratio       PTT - ( 04 Jun 2021 06:51 )  PTT:28.7 sec

## 2021-06-04 NOTE — PROGRESS NOTE ADULT - PROBLEM SELECTOR PLAN 4
Hgb of 9.7  no signs of bleeding noted  f/u anemia panel  monitor CBC daily Hgb of 9.7  no signs of bleeding noted  monitor CBC daily

## 2021-06-04 NOTE — CONSULT NOTE ADULT - CONSULT REASON
pancreatic head mass with obstructive jaundice
Discuss complex medical decision making related to GOC.  DNR/DNI.
Jaundice

## 2021-06-04 NOTE — DISCHARGE NOTE PROVIDER - HOSPITAL COURSE
Patient is a 89 year old female from home AAO x3, with PMH of Left eye blindness, who presented to the ED due to jaundice and weakness. Patient states that she noticed her skin and eyes were yellow about 3 days ago. Patient states that prior to that her skin and eyes were fine. Patient also states having some weakness for the past few days as well. Patient denies any history of any medical issues other than some left eye blindness. Denies any pain, nausea, vomiting, dizziness or headache. Spoke to daughter, Genny ([phone]) for further history.   Total bilirubin of 23.3. Alk phos of 1326. AST/ALT of 175/220. Hgb of 9.7. Direct bilirubin of 20.7. COVID negative. CXR clear. CT abdomen done showing infiltrating relatively hyperenhancing mass in the pancreatic head, extending to the pancreatic duodenal groove, with resulting severe intra and extrahepatic biliary dilatation. Prominently enhancing peripancreatic lymph node.  GI, Dr. Garcia , hepatology, Dr. Nuñez , surgical oncology, Dr. Marrero were consulted and recommended ERCP , palliative spoke with the family and family decided home hospice. Patient is a 89 year old female from home AAO x3, with PMH of Left eye blindness, who presented to the ED due to jaundice and weakness. Patient states that she noticed her skin and eyes were yellow about 3 days ago. Patient states that prior to that her skin and eyes were fine. Patient also states having some weakness for the past few days as well. Patient denies any history of any medical issues other than some left eye blindness.   Total bilirubin of 23.3. Alk phos of 1326. AST/ALT of 175/220. Hgb of 9.7. Direct bilirubin of 20.7. COVID negative. CXR clear. CT abdomen done showing infiltrating relatively hyperenhancing mass in the pancreatic head, extending to the pancreatic duodenal groove, with resulting severe intra and extrahepatic biliary dilatation. Prominently enhancing peripancreatic lymph node.  GI, Dr. Garcia , hepatology, Dr. Nuñez , surgical oncology, Dr. Marrero were consulted and recommended ERCP , palliative spoke with the family and family decided home hospice.

## 2021-06-04 NOTE — PROGRESS NOTE ADULT - ASSESSMENT
89yo Female with left eye blindness presented with generalized weakness and painless jaundice that was noted 3-5 days prior to admission, found to have cholestasis, obstructive pattern with Se bi 23.3, direct bili 20.7, ALP 1326, and transaminase elevation in less extent (, ) and on CT abs/pelvis w iv contrast severe intra- and extrahepatic biliary duct dilation extending to the pancreatic head, with CBD 2.6 cm, and with an irregular enhancing 1.9x1.9 cm mass in the pancreatic head with abrupt narrowing of the CBD at the level of lesion.     - ERCP and possible stent would have been recommended and f/u GI and surgery recommendations, but as per discussion with palliative team and primary team, patient and her family decided against any procedure / intervention, and requested her to be transferred to [facility] until insurance coverage transferred to Medicaid and then planned home hospice. As per palliative notes she is now DNR/DNI/ no feeding tube/comfort measures only.       Thank you for the consult  D/w primary team, GI and palliative care  Please, reconsult if change in status

## 2021-06-04 NOTE — PROGRESS NOTE ADULT - SUBJECTIVE AND OBJECTIVE BOX
PGY-1 Progress Note discussed with attending    PAGER #: [[phone]] TILL 5:00 PM  PLEASE CONTACT ON CALL TEAM:  - On Call Team (Please refer to Bryant) FROM 5:00 PM - 8:30PM  - Nightfloat Team FROM 8:30 -7:30 AM    CHIEF COMPLAINT & BRIEF HOSPITAL COURSE:    INTERVAL HPI/OVERNIGHT EVENTS:       REVIEW OF SYSTEMS:  CONSTITUTIONAL: No fever, weight loss, or fatigue  RESPIRATORY: No cough, wheezing, chills or hemoptysis; No shortness of breath  CARDIOVASCULAR: No chest pain, palpitations, dizziness, or leg swelling  GASTROINTESTINAL: No abdominal pain. No nausea, vomiting, or hematemesis; No diarrhea or constipation. No melena or hematochezia.  GENITOURINARY: No dysuria or hematuria, urinary frequency  NEUROLOGICAL: No headaches, memory loss, loss of strength, numbness, or tremors  SKIN: No itching, burning, rashes, or lesions     MEDICATIONS  (STANDING):  heparin   Injectable 5000 Unit(s) SubCutaneous every 8 hours    MEDICATIONS  (PRN):  ondansetron Injectable 4 milliGRAM(s) IV Push every 6 hours PRN Nausea and/or Vomiting      Vital Signs Last 24 Hrs  T(C): 36.7 (04 Jun 2021 05:07), Max: 36.7 (03 Jun 2021 11:00)  T(F): 98 (04 Jun 2021 05:07), Max: 98 (03 Jun 2021 11:00)  HR: 59 (04 Jun 2021 05:07) (49 - 82)  BP: 104/41 (04 Jun 2021 05:07) (104/41 - 178/70)  BP(mean): --  RR: 16 (04 Jun 2021 05:07) (16 - 18)  SpO2: 98% (04 Jun 2021 05:07) (95% - 99%)    PHYSICAL EXAMINATION:  GENERAL: NAD, well built  HEAD:  Atraumatic, Normocephalic  EYES:  conjunctiva and sclera clear  NECK: Supple, No JVD, Normal thyroid  CHEST/LUNG: Clear to auscultation. Clear to percussion bilaterally; No rales, rhonchi, wheezing, or rubs  HEART: Regular rate and rhythm; No murmurs, rubs, or gallops  ABDOMEN: Soft, Nontender, Nondistended; Bowel sounds present  NERVOUS SYSTEM:  Alert & Oriented X3,    EXTREMITIES:  2+ Peripheral Pulses, No clubbing, cyanosis, or edema  SKIN: warm dry                          9.4    9.57  )-----------( 376      ( 04 Jun 2021 06:51 )             28.1     06-04    136  |  103  |  27<H>  ----------------------------<  114<H>  3.6   |  23  |  1.25    Ca    9.2      04 Jun 2021 06:51  Phos  3.0     06-04  Mg     2.4     06-04    TPro  5.9<L>  /  Alb  2.1<L>  /  TBili  23.0<H>  /  DBili  19.9<H>  /  AST  202<H>  /  ALT  219<H>  /  AlkPhos  1316<H>  06-04    LIVER FUNCTIONS - ( 04 Jun 2021 06:51 )  Alb: 2.1 g/dL / Pro: 5.9 g/dL / ALK PHOS: 1316 U/L / ALT: 219 U/L DA / AST: 202 U/L / GGT: x               PT/INR - ( 04 Jun 2021 06:51 )   PT: 11.3 sec;   INR: 0.95 ratio         PTT - ( 04 Jun 2021 06:51 )  PTT:28.7 sec        CAPILLARY BLOOD GLUCOSE  CAPILLARY BLOOD GLUCOSE      POCT Blood Glucose.: 159 mg/dL (04 Jun 2021 05:43)    CAPILLARY BLOOD GLUCOSE      POCT Blood Glucose.: 159 mg/dL (04 Jun 2021 05:43)      RADIOLOGY & ADDITIONAL TESTS:                   PGY-1 Progress Note discussed with attending    PAGER #: [[phone]] TILL 5:00 PM  PLEASE CONTACT ON CALL TEAM:  - On Call Team (Please refer to Bryant) FROM 5:00 PM - 8:30PM  - Nightfloat Team FROM 8:30 -7:30 AM        INTERVAL HPI/OVERNIGHT EVENTS:   patient examined bedside, she is comfortable, NAD , jaundiced, passed out in the bathroom overnight and rapid response team was called, will follow up GIT recomm , surgery recommending ERCP ,will follow up palliative and hepatology.     REVIEW OF SYSTEMS:  CONSTITUTIONAL: No fever, weight loss, or fatigue  RESPIRATORY: No cough, wheezing, chills or hemoptysis; No shortness of breath  CARDIOVASCULAR: No chest pain, palpitations, dizziness, or leg swelling  GASTROINTESTINAL: No abdominal pain. No nausea, vomiting, or hematemesis; No diarrhea or constipation. No melena or hematochezia.  GENITOURINARY: No dysuria or hematuria, urinary frequency  NEUROLOGICAL: No headaches, memory loss, loss of strength, numbness, or tremors  SKIN: No itching, burning, rashes, or lesions     MEDICATIONS  (STANDING):  heparin   Injectable 5000 Unit(s) SubCutaneous every 8 hours    MEDICATIONS  (PRN):  ondansetron Injectable 4 milliGRAM(s) IV Push every 6 hours PRN Nausea and/or Vomiting      Vital Signs Last 24 Hrs  T(C): 36.7 (04 Jun 2021 05:07), Max: 36.7 (03 Jun 2021 11:00)  T(F): 98 (04 Jun 2021 05:07), Max: 98 (03 Jun 2021 11:00)  HR: 59 (04 Jun 2021 05:07) (49 - 82)  BP: 104/41 (04 Jun 2021 05:07) (104/41 - 178/70)  BP(mean): --  RR: 16 (04 Jun 2021 05:07) (16 - 18)  SpO2: 98% (04 Jun 2021 05:07) (95% - 99%)    PHYSICAL EXAMINATION:  GENERAL: cachectic, jaundiced.   HEAD:  Atraumatic, Normocephalic  EYES:  conjunctiva and sclera icterus  NECK: Supple, No JVD, Normal thyroid  CHEST/LUNG: Clear to auscultation. Clear to percussion bilaterally; No rales, rhonchi, wheezing, or rubs  HEART: Regular rate and rhythm; No murmurs, rubs, or gallops  ABDOMEN: Soft, Nontender, Nondistended; Bowel sounds present  NERVOUS SYSTEM:  Alert & Oriented X3,    EXTREMITIES:  2+ Peripheral Pulses, No clubbing, cyanosis, or edema  SKIN: warm dry, icteric                          9.4    9.57  )-----------( 376      ( 04 Jun 2021 06:51 )             28.1     06-04    136  |  103  |  27<H>  ----------------------------<  114<H>  3.6   |  23  |  1.25    Ca    9.2      04 Jun 2021 06:51  Phos  3.0     06-04  Mg     2.4     06-04    TPro  5.9<L>  /  Alb  2.1<L>  /  TBili  23.0<H>  /  DBili  19.9<H>  /  AST  202<H>  /  ALT  219<H>  /  AlkPhos  1316<H>  06-04    LIVER FUNCTIONS - ( 04 Jun 2021 06:51 )  Alb: 2.1 g/dL / Pro: 5.9 g/dL / ALK PHOS: 1316 U/L / ALT: 219 U/L DA / AST: 202 U/L / GGT: x               PT/INR - ( 04 Jun 2021 06:51 )   PT: 11.3 sec;   INR: 0.95 ratio         PTT - ( 04 Jun 2021 06:51 )  PTT:28.7 sec        CAPILLARY BLOOD GLUCOSE  CAPILLARY BLOOD GLUCOSE      POCT Blood Glucose.: 159 mg/dL (04 Jun 2021 05:43)    CAPILLARY BLOOD GLUCOSE      POCT Blood Glucose.: 159 mg/dL (04 Jun 2021 05:43)      RADIOLOGY & ADDITIONAL TESTS:

## 2021-06-04 NOTE — PROGRESS NOTE ADULT - SUBJECTIVE AND OBJECTIVE BOX
Patient is a 89y old  Female who presents with a chief complaint of jaundice (03 Jun 2021 19:21)    pt seen in icu [  ], reg med floor [   ], bed [  ], chair at bedside [   ], a+o x3 [  ], lethargic [  ],  nad [  ]    sanabria [  ], ngt [  ], peg [  ], et tube [  ], cent line [  ], picc line [  ]        Allergies    No Known Allergies        Vitals    T(F): 98 (06-04-21 @ 05:07), Max: 98 (06-03-21 @ 11:00)  HR: 59 (06-04-21 @ 05:07) (49 - 82)  BP: 104/41 (06-04-21 @ 05:07) (104/41 - 178/70)  RR: 16 (06-04-21 @ 05:07) (16 - 18)  SpO2: 98% (06-04-21 @ 05:07) (95% - 99%)  Wt(kg): --  CAPILLARY BLOOD GLUCOSE      POCT Blood Glucose.: 159 mg/dL (04 Jun 2021 05:43)      Labs                          9.7    8.70  )-----------( 386      ( 03 Jun 2021 12:42 )             28.7       06-03    134<L>  |  100  |  26<H>  ----------------------------<  119<H>  4.3   |  23  |  1.17    Ca    9.0      03 Jun 2021 12:42    TPro  6.5  /  Alb  2.2<L>  /  TBili  23.3<H>  /  DBili  20.7<H>  /  AST  175<H>  /  ALT  220<H>  /  AlkPhos  1326<H>  06-03                Radiology Results      Meds    MEDICATIONS  (STANDING):  heparin   Injectable 5000 Unit(s) SubCutaneous every 8 hours      MEDICATIONS  (PRN):  ondansetron Injectable 4 milliGRAM(s) IV Push every 6 hours PRN Nausea and/or Vomiting      Physical Exam    Neuro :  no focal deficits  Respiratory: CTA B/L  CV: RRR, S1S2, no murmurs,   Abdominal: Soft, NT, ND +BS,  Extremities: No edema, + peripheral pulses    ASSESSMENT    Jaundice    Yes    No pertinent past medical history    No significant past surgical history        PLAN     Patient is a 89y old  Female who presents with a chief complaint of jaundice (03 Jun 2021 19:21)    pt seen in icu [  ], reg med floor [ x  ], bed [ x ], chair at bedside [   ], a+o x3 [ x ], lethargic [  ],  nad [x  ]    pt s/p RRT activated at 5:30AM on 6/4 after patient was noted to lose consciousness while on toilet, witnessed by PCA chaperone. Patient was carried back to her bed by multiple staff members, where she immediately regained consciousness. Patient never hit the ground. She then smiled and thanked all the staff for helping her. She is back to baseline mental status.        Allergies    No Known Allergies        Vitals    T(F): 98 (06-04-21 @ 05:07), Max: 98 (06-03-21 @ 11:00)  HR: 59 (06-04-21 @ 05:07) (49 - 82)  BP: 104/41 (06-04-21 @ 05:07) (104/41 - 178/70)  RR: 16 (06-04-21 @ 05:07) (16 - 18)  SpO2: 98% (06-04-21 @ 05:07) (95% - 99%)  Wt(kg): --  CAPILLARY BLOOD GLUCOSE      POCT Blood Glucose.: 159 mg/dL (04 Jun 2021 05:43)      Labs                          9.7    8.70  )-----------( 386      ( 03 Jun 2021 12:42 )             28.7       06-03    134<L>  |  100  |  26<H>  ----------------------------<  119<H>  4.3   |  23  |  1.17    Ca    9.0      03 Jun 2021 12:42    TPro  6.5  /  Alb  2.2<L>  /  TBili  23.3<H>  /  DBili  20.7<H>  /  AST  175<H>  /  ALT  220<H>  /  AlkPhos  1326<H>  06-03        Radiology Results      < from: CT Abdomen and Pelvis w/ IV Cont (06.03.21 @ 17:28) >  IMPRESSION: Infiltrating relatively hyperenhancing mass in the pancreatic head, extending to the pancreatic duodenal groove, with resulting severe intra and extrahepatic biliary dilatation. Prominently enhancing peripancreatic lymph node. Differential considerations include cholangiocarcinoma and primary neuroendocrine tumor. Pancreatic adenocarcinoma is considered less likely. Recommend ERCP for stent placement and tissue sampling.    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 Prominently enhancing peripancreatic lymph node. Differential considerations include cholangiocarcinoma and primary neuroendocrine tumor. Pancreatic adenocarcinoma is considered less likely noted above.  hepatology cons  gi cons.  as per pt and family they dont want any agressive measures  palliative cons  cont current mrds

## 2021-06-04 NOTE — CONSULT NOTE ADULT - VASCULAR
ANAND SCHOFIELD RN [facility] [phone] Equal and normal pulses (carotid, femoral, dorsalis pedis)

## 2021-06-04 NOTE — PROGRESS NOTE ADULT - SUBJECTIVE AND OBJECTIVE BOX
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 On admission she was afebrile, hemodynamically stable. Labs were significant for normocytic anemia (Hb 9.7), with normal WBC and PLT count, hyperbilirubinemia (Se bi 23.3), mostly direct (20.7), with high ALP (1326) and elevated transaminases (, ), with low alb (2.2), but normal INR (0.95). Also found hypercholesterolemia (Chol 525, , ).   CT abs/pelvis w iv contrast showed severe intra- and extrahepatic biliary duct dilation extending to the pancreatic head, with CBD 2.6 cm, and with an irregular enhancing 1.9x1.9 cm mass in the pancreatic head with abrupt narrowing of the CBD at the level of lesion, and subtle prominence of the upstream pancreatic duct and prominent peripancreatic LN (11x8mm), and atrophic pancreatic body and tail, severe fatty replacement of uncinate process. Liver as reported to have a lobular cyst in R lobe and another in L lobe. Spleen was normal, no ascites.       PMHX/PSHX:  L eye blindness      Allergies:  No Known Allergies      Home Medications: reviewed  Hospital Medications:  heparin   Injectable 5000 Unit(s) SubCutaneous every 8 hours  ondansetron Injectable 4 milliGRAM(s) IV Push every 6 hours PRN      Social History:   Tob: Denies  EtOH: Denies  Illicit Drugs: Denies    Family history:  No pertinent family history in first degree relatives      Denies family history of colon cancer/polyps, stomach cancer/polyps, pancreatic cancer/masses, liver cancer/disease, ovarian cancer and endometrial cancer.    ROS:   General:  No  fevers, chills, night sweats, fatigue  Eyes:  Good vision, no reported pain  ENT:  No sore throat, pain, runny nose  CV:  No pain, palpitations  Pulm:  No dyspnea, cough  GI:  See HPI, otherwise negative  :  No  incontinence, nocturia  Muscle:  No pain, weakness  Neuro:  No memory problems  Psych:  No insomnia, mood problems, depression  Endocrine:  No polyuria, polydipsia, cold/heat intolerance  Heme:  No petechiae, ecchymosis, easy bruisability  Skin:  No rash    PHYSICAL EXAM:   Vital Signs:  Vital Signs Last 24 Hrs  T(C): 36.7 (04 Jun 2021 05:07), Max: 36.7 (03 Jun 2021 11:00)  T(F): 98 (04 Jun 2021 05:07), Max: 98 (03 Jun 2021 11:00)  HR: 59 (04 Jun 2021 05:07) (49 - 82)  BP: 104/41 (04 Jun 2021 05:07) (104/41 - 178/70)  BP(mean): --  RR: 16 (04 Jun 2021 05:07) (16 - 18)  SpO2: 98% (04 Jun 2021 05:07) (95% - 99%)  Daily Height in cm: 165.1 (03 Jun 2021 10:39)    Daily     GENERAL: no acute distress  NEURO: alert, no asterixis  HEENT: anicteric sclera, no conjunctival pallor appreciated  CHEST: no respiratory distress, no accessory muscle use  CARDIAC: regular rate, rhythm  ABDOMEN: soft, non-tender, non-distended, no rebound or guarding  EXTREMITIES: warm, well perfused, no edema  SKIN: no lesions noted    LABS: reviewed                        9.4    9.57  )-----------( 376      ( 04 Jun 2021 06:51 )             28.1     06-04    136  |  103  |  27<H>  ----------------------------<  114<H>  3.6   |  23  |  1.25    Ca    9.2      04 Jun 2021 06:51  Phos  3.0     06-04  Mg     2.4     06-04    TPro  5.9<L>  /  Alb  2.1<L>  /  TBili  23.0<H>  /  DBili  19.9<H>  /  AST  202<H>  /  ALT  219<H>  /  AlkPhos  1316<H>  06-04    LIVER FUNCTIONS - ( 04 Jun 2021 06:51 )  Alb: 2.1 g/dL / Pro: 5.9 g/dL / ALK PHOS: 1316 U/L / ALT: 219 U/L DA / AST: 202 U/L / GGT: x               Diagnostic Studies: see sunrise for full report         Chief Complaint:  Patient is a 89y old  Female who presents with a chief complaint of jaundice (04 Jun 2021 06:29)      HPI:  KATIA JERNIGAN is a 89y 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 On admission she was afebrile, hemodynamically stable. Labs were significant for normocytic anemia (Hb 9.7), with normal WBC and PLT count, hyperbilirubinemia (Se bi 23.3), mostly direct (20.7), with high ALP (1326) and elevated transaminases (, ), with low alb (2.2), but normal INR (0.95). Also found hypercholesterolemia (Chol 525, , ).   CT abs/pelvis w iv contrast showed severe intra- and extrahepatic biliary duct dilation extending to the pancreatic head, with CBD 2.6 cm, and with an irregular enhancing 1.9x1.9 cm mass in the pancreatic head with abrupt narrowing of the CBD at the level of lesion, and subtle prominence of the upstream pancreatic duct and prominent peripancreatic LN (11x8mm), and atrophic pancreatic body and tail, severe fatty replacement of uncinate process. Liver as reported to have a lobular cyst in R lobe and another in L lobe. Spleen was normal, no ascites.   Denies any prior symptoms, has not seen physician for > 1 year. Denies taking any meds beyond 2 pills of "pain meds" after the fall before coming to the hospital.     PMHX/PSHX:  L eye blindness      Allergies:  No Known Allergies      Home Medications: reviewed  Hospital Medications:  heparin   Injectable 5000 Unit(s) SubCutaneous every 8 hours  ondansetron Injectable 4 milliGRAM(s) IV Push every 6 hours PRN      Social History:   Tob: Denies  EtOH: Denies  Illicit Drugs: Denies    Family history:  No pertinent family history in first degree relatives      Denies family history of colon cancer/polyps, stomach cancer/polyps, pancreatic cancer/masses, liver cancer/disease, ovarian cancer and endometrial cancer.    ROS:   General:  No  fevers, chills  Eyes:  Good vision, no reported pain  ENT:  No sore throat, pain, runny nose  CV:  No pain, palpitations  Pulm:  No dyspnea, cough  GI:  Icterus, No abdominal pain, N/V, no diarrhea, reports regular normal BM, denies bleeding   :  No dysuria  Muscle:  No pain, but reports generalized weakness, fell twice  Neuro:  No memory problems  Heme:  Easy bruisability, ecchymoses  Skin:  Icterus    PHYSICAL EXAM:   Vital Signs:  Vital Signs Last 24 Hrs  T(C): 36.7 (04 Jun 2021 05:07), Max: 36.7 (03 Jun 2021 11:00)  T(F): 98 (04 Jun 2021 05:07), Max: 98 (03 Jun 2021 11:00)  HR: 59 (04 Jun 2021 05:07) (49 - 82)  BP: 104/41 (04 Jun 2021 05:07) (104/41 - 178/70)  BP(mean): --  RR: 16 (04 Jun 2021 05:07) (16 - 18)  SpO2: 98% (04 Jun 2021 05:07) (95% - 99%)  Daily Height in cm: 165.1 (03 Jun 2021 10:39)    Daily     GENERAL: no acute distress  NEURO: awake, alert, oriented, no asterixis  HEENT: icterus  CHEST: no respiratory distress, no accessory muscle use  CARDIAC: regular rate, rhythm  ABDOMEN: soft, non-tender, non-distended, no rebound or guarding, BS+, neg Javier  EXTREMITIES: warm, well perfused, no edema  SKIN: icterus    LABS: reviewed                        9.4    9.57  )-----------( 376      ( 04 Jun 2021 06:51 )             28.1     06-04    136  |  103  |  27<H>  ----------------------------<  114<H>  3.6   |  23  |  1.25    Ca    9.2      04 Jun 2021 06:51  Phos  3.0     06-04  Mg     2.4     06-04    TPro  5.9<L>  /  Alb  2.1<L>  /  TBili  23.0<H>  /  DBili  19.9<H>  /  AST  202<H>  /  ALT  219<H>  /  AlkPhos  1316<H>  06-04    LIVER FUNCTIONS - ( 04 Jun 2021 06:51 )  Alb: 2.1 g/dL / Pro: 5.9 g/dL / ALK PHOS: 1316 U/L / ALT: 219 U/L DA / AST: 202 U/L / GGT: x               Diagnostic Studies: see sunrise for full report

## 2021-06-04 NOTE — CONSULT NOTE ADULT - PROBLEM SELECTOR RECOMMENDATION 4
89 year old female from home AAO x3, with PMH of Left eye blindness, who presented to the ED due to jaundice and weakness.   No further work up.  Please see discussion noted above in GOC conversation.

## 2021-06-04 NOTE — CONSULT NOTE ADULT - SUBJECTIVE AND OBJECTIVE BOX
DATE OF SERVICE: 06/04/2021    [   ] STAT REQUEST              [ X ] ROUTINE REQUEST      Patient is a 89 year old female with Jaundice. GI consulted to evaluate.        HPI:  Patient is a 89 year old female from home AAO x3, with past medical history of Left eye blindness, who presented to the ED due to jaundice and weakness. Patient states that she noticed her skin and eyes were yellow about 3 days ago. Patient states that prior to that her skin and eyes were fine. Patient also states having some weakness for the past few days as well. Patient denies any history of any medical issues other than some left eye blindness. Denies any pain, nausea, vomiting, dizziness or headache. Spoke to daughter, Genny ([phone]) for further history. Daughter states that the patient lives alone at home but the daughter lives near by. Patient has a HHA 3days/week. Daughter states she had noticed patient's skin was yellow on Sunday.  No abdominal pain, nausea, vomiting, hematemesis, hematochezia, melena, fever, chills, chest pain, SOB, cough, hematuria or dysuria reported.       PAIN MANAGEMENT:  Pain Scale:                 0/10  Pain Location:      Prior Colonoscopy:  No prior colonoscopy    PAST MEDICAL HISTORY  Blind       PAST SURGICAL HISTORY  No significant past surgical history        Allergies    No Known Allergies    Intolerances    None    HOME MEDICATIONS    MEDICATIONS  (STANDING):  heparin   Injectable 5000 Unit(s) SubCutaneous every 8 hours    MEDICATIONS  (PRN):  ondansetron Injectable 4 milliGRAM(s) IV Push every 6 hours PRN Nausea and/or Vomiting      SOCIAL HISTORY  Advanced Directives:       [  ] Full Code       [ X ] DNR  Marital Status:         [  ] M      [ X ] S      [  ] D       [  ] W  Children:       [ X ] Yes      [  ] No  Occupation:        [  ] Employed       [ X ] Unemployed       [  ] Retired  Diet:       [ X ] Regular       [  ] PEG feeding          [  ] NG tube feeding  Drug Use:           [ X ] Patient denied          [  ] Yes  Alcohol:           [X  ] No             [  ] Yes (socially)         [  ] Yes (chronic)  Tobacco:           [  ] Yes           [ X ] No      FAMILY HISTORY  [X  ] Heart Disease            [ X ] Diabetes             [ X ] HTN             [  ] Colon Cancer             [  ] Stomach Cancer              [  ] Pancreatic Cancer      VITAL SIGNS   Vital Signs Last 24 Hrs  T(C): 36.8 (04 Jun 2021 19:27), Max: 36.8 (04 Jun 2021 19:27)  T(F): 98.2 (04 Jun 2021 19:27), Max: 98.2 (04 Jun 2021 19:27)  HR: 66 (04 Jun 2021 19:27) (59 - 67)  BP: 132/64 (04 Jun 2021 19:27) (104/41 - 132/64)   RR: 19 (04 Jun 2021 19:27) (16 - 19)  SpO2: 97% (04 Jun 2021 19:27) (97% - 99%)        CBC Full  -  ( 04 Jun 2021 06:51 )  WBC Count : 9.57 K/uL  RBC Count : 3.09 M/uL  Hemoglobin : 9.4 g/dL  Hematocrit : 28.1 %  Platelet Count - Automated : 376 K/uL  Mean Cell Volume : 90.9 fl  Mean Cell Hemoglobin : 30.4 pg  Mean Cell Hemoglobin Concentration : 33.5 gm/dL  Auto Neutrophil # : 7.58 K/uL  Auto Lymphocyte # : 1.00 K/uL  Auto Monocyte # : 0.62 K/uL  Auto Eosinophil # : 0.15 K/uL  Auto Basophil # : 0.10 K/uL  Auto Neutrophil % : 79.2 %  Auto Lymphocyte % : 10.4 %  Auto Monocyte % : 6.5 %  Auto Eosinophil % : 1.6 %  Auto Basophil % : 1.0 %      06-04    136  |  103  |  27<H>  ----------------------------<  114<H>  3.6   |  23  |  1.25    Ca    9.2      04 Jun 2021 06:51  Phos  3.0     06-04  Mg     2.4     06-04    TPro  5.9<L>  /  Alb  2.1<L>  /  TBili  23.0<H>  /  DBili  19.9<H>  /  AST  202<H>  /  ALT  219<H>  /  AlkPhos  1316<H>  06-04     PT/INR - ( 04 Jun 2021 06:51 )   PT: 11.3 sec;   INR: 0.95 ratio       PTT - ( 04 Jun 2021 06:51 )  PTT:28.7 sec    Iron with Total Binding Capacity (06.04.21 @ 06:51)   % Saturation, Iron: 19 %   Iron - Total Binding Capacity.: 229 ug/dL   Iron Total, Serum: 43 ug/dL   Unsaturated Iron Binding Capacity: 186 ug/dL     Urinalysis (09.30.18 @ 11:02)   Glucose Qualitative, Urine: Negative   Blood, Urine: Small   pH Urine: 6.0   Color: Yellow   Urine Appearance: Clear   Bilirubin: Negative   Ketone - Urine: Negative   Specific Gravity: 1.010   Protein, Urine: 15   Urobilinogen: Negative   Nitrite: Positive   Leukocyte Esterase Concentration: Negative       ECG  Ventricular Rate 89 BPM    Atrial Rate 89 BPM    P-R Interval 204 ms    QRS Duration 88 ms    Q-T Interval 358 ms    QTC Calculation(Bazett) 435 ms    P Axis 68 degrees    R Axis -21 degrees    T Axis 82 degrees    Diagnosis Line Sinus rhythm with frequent Premature ventricular complexes         RADIOLOGY/IMAGING                EXAM:  CT ABDOMEN AND PELVIS IC                            PROCEDURE DATE:  06/03/2021          INTERPRETATION:  CLINICAL INFORMATION: Jaundice.    COMPARISON: None.    PROCEDURE:  CT of the Abdomen and Pelvis was performed without oral contrast and with Omnipaque intravenous contrast.  Sagittal and coronal reformats were performed.    FINDINGS:  LOWER CHEST: Within normal limits.    LIVER: There is a lobular cyst in the right lobe of the liver inferiorly and a lobular cyst in the left lobe laterally. The hepatic veins and portal vein are patent.  BILE DUCTS: Severe intra and extrahepatic biliary duct dilatation extending to the pancreatic head. The common bile duct measures up to 2.6 cm in diameter.  GALLBLADDER: Distended with mild uniform wall enhancement.  SPLEEN: Within normal limits.  PANCREAS: There is an irregular enhancing 1.9 x 1.9 cm relatively hyperenhancing mass in the pancreatic head, extending along the pancreaticoduodenal groove, with abrupt narrowing of the common bile duct at the level of the lesion. There is subtle prominence of the upstream pancreatic duct. There is no evidence of involvement of the portal vein, SMV, or SMA. There is a prominently enhancing peripancreatic lymph node measuring 11 x 8 mm on image 59. The pancreatic body and tail are moderately atrophic. There is severe fatty replacement of the uncinate process of the pancreas.  ADRENALS: Within normal limits.  KIDNEYS/URETERS: The left kidney is mildly atrophic with scarring in the upper pole. There are tiny left renal cysts. No evidence of hydronephrosis, mass, stone, or perinephric stranding bilaterally.    BLADDER: Within normal limits.  REPRODUCTIVE ORGANS: Uterus and adnexa within normal limits.    BOWEL: Pills are noted in the stomach. There is moderate concentric low-attenuation wall thickening of the gastric antrum and pylorus with prominent mucosal enhancement, suggestive of gastritis. Extensive diverticulosis of the sigmoid colon. No bowel obstruction. Appendix is normal.  PERITONEUM: No ascites.  VESSELS: Within normal limits.  RETROPERITONEUM/LYMPH NODES: No lymphadenopathy.  ABDOMINAL WALL: Within normal limits.  BONES: Status post left total hip replacement. Severe degenerative changes of the right hip. Healed left superior and inferior pubic rami fractures. Marked loss of bone mineral density, compatible with osteoporosis. Chronic-appearing compression deformities of the thoracic and lumbar spine.    IMPRESSION: Infiltrating relatively hyperenhancing mass in the pancreatic head, extending to the pancreatic duodenal groove, with resulting severe intra and extrahepatic biliary dilatation. Prominently enhancing peripancreatic lymph node. Differential considerations include cholangiocarcinoma and primary neuroendocrine tumor. Pancreatic adenocarcinoma is considered less likely. Recommend ERCP for stent placement and tissue sampling.

## 2021-06-04 NOTE — CONSULT NOTE ADULT - NEGATIVE GASTROINTESTINAL SYMPTOMS
no nausea/no vomiting/no diarrhea/no constipation/no abdominal pain/no melena/no hematochezia/no steatorrhea/no hiccoughs

## 2021-06-04 NOTE — GOALS OF CARE CONVERSATION - ADVANCED CARE PLANNING - CONVERSATION DETAILS
Palliative care NP and SW met with the pt and her family at the bedside, discussed her current clinical condition and goals of care. Pt stated she has lived a great life.  She has survived war, travelled all over the world, and is ready to go to heaven.  She does not want any further diagnotic work up or medical interventions.    Discussed the risks vs benefits of cardiopulmonary resuscitation , intubation and artificial nutrition.  Pt stated she does not want any of those interventions.  She is ready to go, and she is looking forward to it.  Her daughter and son-in-law agreed with her decisions.  DORI drafted; DNR/DNI/no feeding tube/comfort measures only.  Pt deferred to her family for any further discussions.    Pt's family expressed appreciation for palliative care services and agreed to reach out as needed.

## 2021-06-04 NOTE — CONSULT NOTE ADULT - ASSESSMENT
89 year old female with obstructive jaundice secondary to large pancreatic head mass, intra and extrahepatic ductal dilation. TBili 23.   Afebrile, no leukocytosis  GOC: DNR/DNI, family considering palliative    - recommend GI consult  - if agreeable would recommend ERCP   - check CA 19-9 and CEA  - continue to trend labs  - palliative consult  - continue medical management  - will discuss with Dr. Marrero
1. Painless jaundice  2. Pancreatic mass  3. R/o pancreatic cancer  4. Intra and extrahepatic biliary dilation  5. R/o cholangiocarcinoma    1. Antibiotics IV  2. ERCP  3. Surgical evaluation  4. Check   5. Protonix daily  6. Avoid NSAID  7. DVT prophylaxis

## 2021-06-04 NOTE — CONSULT NOTE ADULT - ATTENDING COMMENTS
89 y F adm w painless jaundice, CT showed biliary ductal dilatation 2/2 pancreatic head mass. Pt/family do not want further workup or treatment. DNR/DNI. Denies pain. Lives alone. Family requesting [facility]ERTO, report not enough support at home for home hospice at this time. CECE haas.

## 2021-06-04 NOTE — CONSULT NOTE ADULT - NEGATIVE ENMT SYMPTOMS
no hearing difficulty/no ear pain/no tinnitus/no vertigo/no nose bleeds/no gum bleeding/no dry mouth/no throat pain/no dysphagia

## 2021-06-04 NOTE — CONSULT NOTE ADULT - PROBLEM SELECTOR RECOMMENDATION 2
Cachetic.  Bitemporal wasting with muscle mass/fat loss.  Unintended weight loss. Cachetic.  Bitemporal wasting with muscle mass/fat loss.  Unintended weight loss.  Albumin 2.1. BMI 21. Diet  as tolerated.        Nutrition consult    No feeding tube

## 2021-06-04 NOTE — CONSULT NOTE ADULT - NEGATIVE NEUROLOGICAL SYMPTOMS
no tremors/no vertigo/no loss of sensation/no difficulty walking/no headache/no loss of consciousness/no hemiparesis/no confusion

## 2021-06-04 NOTE — DISCHARGE NOTE PROVIDER - NSDCCPCAREPLAN_GEN_ALL_CORE_FT
PRINCIPAL DISCHARGE DIAGNOSIS  Diagnosis: Painless jaundice  Assessment and Plan of Treatment: You presented with jaundice. Total bilirubin of 23.3. Alk phos of 1326. AST/ALT of 175/220. Hgb of 9.7. Direct bilirubin of 20.7. CT abdomen was done and showed  infiltrating relatively hyperenhancing mass in the pancreatic head, extending to the pancreatic duodenal groove, with resulting severe intra and extrahepatic biliary dilatation. Prominently enhancing peripancreatic lymph node.  GI, Dr. Garcia , hepatology, Dr. Nuñez , surgical oncology, Dr. Marrero were consulted and recommended ERCP , palliative spoke with the family and family decided home hospice.      SECONDARY DISCHARGE DIAGNOSES  Diagnosis: Pancreatic mass  Assessment and Plan of Treatment: You presented with jaundice. Total bilirubin of 23.3. Alk phos of 1326. AST/ALT of 175/220. Hgb of 9.7. Direct bilirubin of 20.7. CT abdomen was done and showed  infiltrating relatively hyperenhancing mass in the pancreatic head, extending to the pancreatic duodenal groove, with resulting severe intra and extrahepatic biliary dilatation. Prominently enhancing peripancreatic lymph node.  GI, Dr. Garcia , hepatology, Dr. Nuñez , surgical oncology, Dr. Marrero were consulted and recommended ERCP , palliative spoke with the family and family decided home hospice.    Diagnosis: Transaminitis  Assessment and Plan of Treatment: Your liver enzymes were elevated , likely related to the pancreatic mass. CT abdomen was done and showed  infiltrating relatively hyperenhancing mass in the pancreatic head, extending to the pancreatic duodenal groove, with resulting severe intra and extrahepatic biliary dilatation. Prominently enhancing peripancreatic lymph node.  GI, Dr. Garcia , hepatology, Dr. Nuñez , surgical oncology, Dr. Marrero were consulted and recommended ERCP , palliative spoke with the family and family decided home hospice.

## 2021-06-04 NOTE — PROGRESS NOTE ADULT - PROBLEM SELECTOR PLAN 1
patient presented due to jaundice of skin and eyes  noted to have elevated LFTs on admission   CT abdomen showing infiltrating relatively hyperenhancing mass in the pancreatic head, extending to the pancreatic duodenal groove, with resulting severe intra and extrahepatic biliary dilatation. Prominently enhancing peripancreatic lymph node  GI, Dr. Garcia consulted  hepatology, Dr. Nuñez consulted   surgical oncology, Dr. Marrero consulted   f/u Ca 19.9, CEA, AFP, hepatitis panel   monitor CMP daily patient presented due to jaundice of skin and eyes  noted to have elevated LFTs on admission   CT abdomen showing infiltrating relatively hyperenhancing mass in the pancreatic head, extending to the pancreatic duodenal groove, with resulting severe intra and extrahepatic biliary dilatation. Prominently enhancing peripancreatic lymph node  GI, Dr. Garcia consulted  hepatology, Dr. Nuñez consulted   surgical oncology, Dr. Marrero consulted   monitor CMP daily  surgery recommending ERCP.

## 2021-06-04 NOTE — CONSULT NOTE ADULT - PROBLEM SELECTOR RECOMMENDATION 9
p/w jaundice and weakness.  Pt denies n/v, itching, or pain.  Hepatology following      Pt and family stated they do not want any further medical interventions, including ERCP and possible stent.  PT is DNR/DNI      CT abdomen showing infiltrating relatively hyperenhancing mass in the pancreatic head, extending to the pancreatic duodenal groove, with resulting severe intra and extrahepatic biliary dilatation. Prominently enhancing peripancreatic lymph node.  Differential considerations include cholangiocarcinoma and primary neuroendocrine tumor

## 2021-06-05 RX ADMIN — HEPARIN SODIUM 5000 UNIT(S): 5000 INJECTION INTRAVENOUS; SUBCUTANEOUS at 21:48

## 2021-06-05 RX ADMIN — HEPARIN SODIUM 5000 UNIT(S): 5000 INJECTION INTRAVENOUS; SUBCUTANEOUS at 13:58

## 2021-06-05 RX ADMIN — HEPARIN SODIUM 5000 UNIT(S): 5000 INJECTION INTRAVENOUS; SUBCUTANEOUS at 05:13

## 2021-06-05 NOTE — PROGRESS NOTE ADULT - SUBJECTIVE AND OBJECTIVE BOX
PGY-1 Progress Note discussed with attending    PAGER #: [[phone]] TILL 5:00 PM  PLEASE CONTACT ON CALL TEAM:  - On Call Team (Please refer to Bryant) FROM 5:00 PM - 8:30PM  - Nightfloat Team FROM 8:30 -7:30 AM        INTERVAL HPI/OVERNIGHT EVENTS:   patient examined bedside, she is comfortable , NAD , palliative discussed with the family and patient is for home hospice,       REVIEW OF SYSTEMS:  CONSTITUTIONAL: No fever, weight loss, or fatigue  RESPIRATORY: No cough, wheezing, chills or hemoptysis; No shortness of breath  CARDIOVASCULAR: No chest pain, palpitations, dizziness, or leg swelling  GASTROINTESTINAL: No abdominal pain. No nausea, vomiting, or hematemesis; No diarrhea or constipation. No melena or hematochezia.  GENITOURINARY: No dysuria or hematuria, urinary frequency  NEUROLOGICAL: No headaches, memory loss, loss of strength, numbness, or tremors  SKIN: No itching, burning, rashes, or lesions     MEDICATIONS  (STANDING):  heparin   Injectable 5000 Unit(s) SubCutaneous every 8 hours    MEDICATIONS  (PRN):  ondansetron Injectable 4 milliGRAM(s) IV Push every 6 hours PRN Nausea and/or Vomiting      Vital Signs Last 24 Hrs  T(C): 36.4 (05 Jun 2021 05:00), Max: 36.8 (04 Jun 2021 19:27)  T(F): 97.5 (05 Jun 2021 05:00), Max: 98.2 (04 Jun 2021 19:27)  HR: 61 (05 Jun 2021 05:00) (61 - 67)  BP: 110/63 (05 Jun 2021 05:00) (104/43 - 132/64)  BP(mean): --  RR: 18 (05 Jun 2021 05:00) (18 - 19)  SpO2: 97% (05 Jun 2021 05:00) (97% - 99%)    PHYSICAL EXAMINATION:  GENERAL: cachectic, jaundiced.   HEAD:  Atraumatic, Normocephalic  EYES:  conjunctiva and sclera icterus  NECK: Supple, No JVD, Normal thyroid  CHEST/LUNG: Clear to auscultation. Clear to percussion bilaterally; No rales, rhonchi, wheezing, or rubs  HEART: Regular rate and rhythm; No murmurs, rubs, or gallops  ABDOMEN: Soft, Nontender, Nondistended; Bowel sounds present  NERVOUS SYSTEM:  Alert & Oriented X3,    EXTREMITIES:  2+ Peripheral Pulses, No clubbing, cyanosis, or edema  SKIN: warm dry, icteric                          9.4    9.57  )-----------( 376      ( 04 Jun 2021 06:51 )             28.1     06-04    136  |  103  |  27<H>  ----------------------------<  114<H>  3.6   |  23  |  1.25    Ca    9.2      04 Jun 2021 06:51  Phos  3.0     06-04  Mg     2.4     06-04    TPro  5.9<L>  /  Alb  2.1<L>  /  TBili  23.0<H>  /  DBili  19.9<H>  /  AST  202<H>  /  ALT  219<H>  /  AlkPhos  1316<H>  06-04    LIVER FUNCTIONS - ( 04 Jun 2021 06:51 )  Alb: 2.1 g/dL / Pro: 5.9 g/dL / ALK PHOS: 1316 U/L / ALT: 219 U/L DA / AST: 202 U/L / GGT: x               PT/INR - ( 04 Jun 2021 06:51 )   PT: 11.3 sec;   INR: 0.95 ratio         PTT - ( 04 Jun 2021 06:51 )  PTT:28.7 sec        CAPILLARY BLOOD GLUCOSE  CAPILLARY BLOOD GLUCOSE      POCT Blood Glucose.: 150 mg/dL (04 Jun 2021 11:15)    CAPILLARY BLOOD GLUCOSE      POCT Blood Glucose.: 150 mg/dL (04 Jun 2021 11:15)      RADIOLOGY & ADDITIONAL TESTS:

## 2021-06-05 NOTE — PHYSICAL THERAPY INITIAL EVALUATION ADULT - PERSONAL SAFETY AND JUDGMENT, REHAB EVAL
Patient needs to be verbally cued to slow down. Have tendency to stand up too quickly which will contribute to risk of falling./at risk behaviors demonstrated

## 2021-06-05 NOTE — PROGRESS NOTE ADULT - PROBLEM SELECTOR PLAN 4
Hgb of 9.7  no signs of bleeding noted   palliative discussed with the family and patient is for home hospice,

## 2021-06-05 NOTE — PHYSICAL THERAPY INITIAL EVALUATION ADULT - PERTINENT HX OF CURRENT PROBLEM, REHAB EVAL
Patient is a 89 y/o female from home with PMHx of Left eye blindness. No significant PSHx. Admitted due to jaundice and weakness.

## 2021-06-05 NOTE — PROGRESS NOTE ADULT - PROBLEM SELECTOR PLAN 1
patient presented due to jaundice of skin and eyes  noted to have elevated LFTs on admission   CT abdomen showing infiltrating relatively hyperenhancing mass in the pancreatic head, extending to the pancreatic duodenal groove, with resulting severe intra and extrahepatic biliary dilatation. Prominently enhancing peripancreatic lymph node  GI, Dr. Garcia consulted  hepatology, Dr. Nuñez consulted   surgical oncology, Dr. Marrero consulted   monitor CMP daily  surgery recommending ERCP.   palliative discussed with the family and patient is for home hospice,

## 2021-06-05 NOTE — PROGRESS NOTE ADULT - ASSESSMENT
1. Painless jaundice  2. Pancreatic mass  3. R/o pancreatic cancer  4. Intra and extrahepatic biliary dilation  5. R/o cholangiocarcinoma    1. Antibiotics IV  2. ERCP  3. Surgical evaluation  4. Check   5. Protonix daily  6. Avoid NSAID  7. DVT prophylaxis

## 2021-06-05 NOTE — PROGRESS NOTE ADULT - SUBJECTIVE AND OBJECTIVE BOX
Patient is a 89y old  Female who presents with a chief complaint of jaundice (04 Jun 2021 22:11)    pt seen in icu [  ], reg med floor [ x  ], bed [ x ], chair at bedside [   ], a+o x3 [ x ], lethargic [  ],  nad [x  ]    pt s/p RRT activated at 5:30AM on 6/4 after patient was noted to lose consciousness while on toilet, witnessed by PCA chaperone. Patient was carried back to her bed by multiple staff members, where she immediately regained consciousness. Patient never hit the ground. She then smiled and thanked all the staff for helping her. She is back to baseline mental status.      Allergies    No Known Allergies        Vitals    T(F): 97.5 (06-05-21 @ 05:00), Max: 98.2 (06-04-21 @ 19:27)  HR: 61 (06-05-21 @ 05:00) (61 - 67)  BP: 110/63 (06-05-21 @ 05:00) (104/43 - 132/64)  RR: 18 (06-05-21 @ 05:00) (18 - 19)  SpO2: 97% (06-05-21 @ 05:00) (97% - 99%)  Wt(kg): --  CAPILLARY BLOOD GLUCOSE      POCT Blood Glucose.: 150 mg/dL (04 Jun 2021 11:15)      Labs                          9.4    9.57  )-----------( 376      ( 04 Jun 2021 06:51 )             28.1       06-04    136  |  103  |  27<H>  ----------------------------<  114<H>  3.6   |  23  |  1.25    Ca    9.2      04 Jun 2021 06:51  Phos  3.0     06-04  Mg     2.4     06-04    TPro  5.9<L>  /  Alb  2.1<L>  /  TBili  23.0<H>  /  DBili  19.9<H>  /  AST  202<H>  /  ALT  219<H>  /  AlkPhos  1316<H>  06-04                Radiology Results      Meds    MEDICATIONS  (STANDING):  heparin   Injectable 5000 Unit(s) SubCutaneous every 8 hours      MEDICATIONS  (PRN):  ondansetron Injectable 4 milliGRAM(s) IV Push every 6 hours PRN Nausea and/or Vomiting      Physical Exam    Neuro :  no focal deficits  Respiratory: CTA B/L  CV: RRR, S1S2, no murmurs,   Abdominal: Soft, NT, ND +BS,  Extremities: No edema, + peripheral pulses    ASSESSMENT    painless jaundice 2nd to pancreatic head mass liver failure, anemia    Yes    No pertinent past medical history    No significant past surgical history        PLAN      ct abd pelv with Infiltrating relatively hyperenhancing mass in the pancreatic head, extending to the pancreatic duodenal groove, with resulting severe intra and extrahepatic biliary dilatation. Prominently enhancing peripancreatic lymph node. Differential considerations include cholangiocarcinoma and primary neuroendocrine tumor. Pancreatic adenocarcinoma is considered less likely noted above.  hepatology cons  gi cons.  as per pt and family they dont want any agressive measures  palliative cons  cont current mrds   Patient is a 89y old  Female who presents with a chief complaint of jaundice (04 Jun 2021 22:11)    pt seen in icu [  ], reg med floor [ x  ], bed [ x ], chair at bedside [   ], a+o x3 [ x ], lethargic [  ],  nad [x  ]      Allergies    No Known Allergies        Vitals    T(F): 97.5 (06-05-21 @ 05:00), Max: 98.2 (06-04-21 @ 19:27)  HR: 61 (06-05-21 @ 05:00) (61 - 67)  BP: 110/63 (06-05-21 @ 05:00) (104/43 - 132/64)  RR: 18 (06-05-21 @ 05:00) (18 - 19)  SpO2: 97% (06-05-21 @ 05:00) (97% - 99%)  Wt(kg): --  CAPILLARY BLOOD GLUCOSE      POCT Blood Glucose.: 150 mg/dL (04 Jun 2021 11:15)      Labs                          9.4    9.57  )-----------( 376      ( 04 Jun 2021 06:51 )             28.1       06-04    136  |  103  |  27<H>  ----------------------------<  114<H>  3.6   |  23  |  1.25    Ca    9.2      04 Jun 2021 06:51  Phos  3.0     06-04  Mg     2.4     06-04    TPro  5.9<L>  /  Alb  2.1<L>  /  TBili  23.0<H>  /  DBili  19.9<H>  /  AST  202<H>  /  ALT  219<H>  /  AlkPhos  1316<H>  06-04                Radiology Results      Meds    MEDICATIONS  (STANDING):  heparin   Injectable 5000 Unit(s) SubCutaneous every 8 hours      MEDICATIONS  (PRN):  ondansetron Injectable 4 milliGRAM(s) IV Push every 6 hours PRN Nausea and/or Vomiting      Physical Exam    Neuro :  no focal deficits  Respiratory: CTA B/L  CV: RRR, S1S2, no murmurs,   Abdominal: Soft, NT, ND +BS,  Extremities: No edema, + peripheral pulses    ASSESSMENT    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 Prominently enhancing peripancreatic lymph node. Differential considerations include cholangiocarcinoma and primary neuroendocrine tumor. Pancreatic adenocarcinoma is considered less likely noted   hepatology cons noted  gi cons noted.  as per pt and family they dont want any agressive measures  palliative cons noted  MOLST drafted; DNR/DNI/no feeding tube/comfort measures only.  Pt deferred to her family for any further discussions.    Family would like pt to be discharged to [facility] given limitation of Medicare, whilst they work on transitioning insurance coverage to Medicaid.    Plan is to then have pt transferred home with hospice.  phys tx eval  cont current meds   d/c planning

## 2021-06-05 NOTE — PHYSICAL THERAPY INITIAL EVALUATION ADULT - ADDITIONAL COMMENTS
Patient lives in a private house with stairs to access 2nd flr. Patient reports she stays on 1st floor with kitchen and bathroom access. She Prepares her breakfast and gets meals on Wheels the rest of the day. Patient able to access and utilize toilet witout assist but with HHA 3x/week that assists in shower. Patient uses walker for home ambulation.

## 2021-06-05 NOTE — PHYSICAL THERAPY INITIAL EVALUATION ADULT - GENERAL OBSERVATIONS, REHAB EVAL
Patient was seen for PT evaluation today. EMR, laboratory and radiology results reviewed. Pt received supine in bed, NAD

## 2021-06-05 NOTE — PHYSICAL THERAPY INITIAL EVALUATION ADULT - DIAGNOSIS, PT EVAL
Patient with functional limitation, decrease ambulation and OOB activities due to generalized weakness.

## 2021-06-05 NOTE — PROGRESS NOTE ADULT - SUBJECTIVE AND OBJECTIVE BOX
DATE OF SERVICE: 06/05/2021    [   ] ICU                                          [   ] CCU                                      [  X ] Medical Floor    Patient is a 89 year old female with Jaundice. GI consulted to evaluate.         Patient is a 89 year old female from home AAO x3, with past medical history of Left eye blindness, who presented to the ED due to jaundice and weakness. Patient states that she noticed her skin and eyes were yellow about 3 days ago. Patient states that prior to that her skin and eyes were fine. Patient also states having some weakness for the past few days as well. Patient denies any history of any medical issues other than some left eye blindness. Denies any pain, nausea, vomiting, dizziness or headache. Spoke to daughter, Genny ([phone]) for further history. Daughter states that the patient lives alone at home but the daughter lives near by. Patient has a HHA 3days/week. Daughter states she had noticed patient's skin was yellow on Sunday.  No abdominal pain, nausea, vomiting, hematemesis, hematochezia, melena, fever, chills, chest pain, SOB, cough, hematuria or dysuria reported.     Today patient appears comfortable but lethargic. No new complaints reported, No abdominal pain, N/V, hematemesis, hematochezia, melena, fever, chills, chest pain, SOB, cough or diarrhea reported.      PAIN MANAGEMENT:  Pain Scale:                 0/10  Pain Location:      Prior Colonoscopy:  No prior colonoscopy    PAST MEDICAL HISTORY  Blind       PAST SURGICAL HISTORY  No significant past surgical history        Allergies    No Known Allergies    Intolerances    None    HOME MEDICATIONS    MEDICATIONS  (STANDING):  heparin   Injectable 5000 Unit(s) SubCutaneous every 8 hours    MEDICATIONS  (PRN):  ondansetron Injectable 4 milliGRAM(s) IV Push every 6 hours PRN Nausea and/or Vomiting      SOCIAL HISTORY  Advanced Directives:       [  ] Full Code       [ X ] DNR  Marital Status:         [  ] M      [ X ] S      [  ] D       [  ] W  Children:       [ X ] Yes      [  ] No  Occupation:        [  ] Employed       [ X ] Unemployed       [  ] Retired  Diet:       [ X ] Regular       [  ] PEG feeding          [  ] NG tube feeding  Drug Use:           [ X ] Patient denied          [  ] Yes  Alcohol:           [X  ] No             [  ] Yes (socially)         [  ] Yes (chronic)  Tobacco:           [  ] Yes           [ X ] No      FAMILY HISTORY  [X  ] Heart Disease            [ X ] Diabetes             [ X ] HTN             [  ] Colon Cancer             [  ] Stomach Cancer              [  ] Pancreatic Cancer      VITALS  Vital Signs Last 24 Hrs  T(C): 36.4 (05 Jun 2021 05:00), Max: 36.8 (04 Jun 2021 19:27)  T(F): 97.5 (05 Jun 2021 05:00), Max: 98.2 (04 Jun 2021 19:27)  HR: 61 (05 Jun 2021 05:00) (61 - 67)  BP: 110/63 (05 Jun 2021 05:00) (104/43 - 132/64)   RR: 18 (05 Jun 2021 05:00) (18 - 19)  SpO2: 97% (05 Jun 2021 05:00) (97% - 99%)       MEDICATIONS  (STANDING):  heparin   Injectable 5000 Unit(s) SubCutaneous every 8 hours    MEDICATIONS  (PRN):  ondansetron Injectable 4 milliGRAM(s) IV Push every 6 hours PRN Nausea and/or Vomiting                            9.4    9.57  )-----------( 376      ( 04 Jun 2021 06:51 )             28.1       06-04    136  |  103  |  27<H>  ----------------------------<  114<H>  3.6   |  23  |  1.25    Ca    9.2      04 Jun 2021 06:51  Phos  3.0     06-04  Mg     2.4     06-04    TPro  5.9<L>  /  Alb  2.1<L>  /  TBili  23.0<H>  /  DBili  19.9<H>  /  AST  202<H>  /  ALT  219<H>  /  AlkPhos  1316<H>  06-04      PT/INR - ( 04 Jun 2021 06:51 )   PT: 11.3 sec;   INR: 0.95 ratio         PTT - ( 04 Jun 2021 06:51 )  PTT:28.7 sec

## 2021-06-05 NOTE — PHYSICAL THERAPY INITIAL EVALUATION ADULT - TRANSFER SAFETY CONCERNS NOTED: SIT/STAND, REHAB EVAL
decreased safety awareness/decreased sequencing ability/stepping too close to front of assistive device

## 2021-06-06 LAB — GLUCOSE BLDC GLUCOMTR-MCNC: 79 MG/DL — SIGNIFICANT CHANGE UP (ref 70–99)

## 2021-06-06 RX ORDER — DIPHENHYDRAMINE HCL 50 MG
25 CAPSULE ORAL ONCE
Refills: 0 | Status: COMPLETED | OUTPATIENT
Start: 2021-06-06 | End: 2021-06-06

## 2021-06-06 RX ADMIN — Medication 25 MILLIGRAM(S): at 17:20

## 2021-06-06 RX ADMIN — HEPARIN SODIUM 5000 UNIT(S): 5000 INJECTION INTRAVENOUS; SUBCUTANEOUS at 22:47

## 2021-06-06 RX ADMIN — HEPARIN SODIUM 5000 UNIT(S): 5000 INJECTION INTRAVENOUS; SUBCUTANEOUS at 13:02

## 2021-06-06 RX ADMIN — HEPARIN SODIUM 5000 UNIT(S): 5000 INJECTION INTRAVENOUS; SUBCUTANEOUS at 05:53

## 2021-06-06 NOTE — DIETITIAN INITIAL EVALUATION ADULT. - PERTINENT LABORATORY DATA
06-04 Na136 mmol/L Glu 114 mg/dL<H> K+ 3.6 mmol/L Cr  1.25 mg/dL BUN 27 mg/dL<H> 06-04 Phos 3.0 mg/dL 06-04 Alb 2.1 g/dL<L> 06-04 Chol 525 mg/dL<H> LDL --    HDL 9 mg/dL<L> Trig 178 mg/dL<H>

## 2021-06-06 NOTE — PROGRESS NOTE ADULT - SUBJECTIVE AND OBJECTIVE BOX
Patient is a 89y old  Female who presents with a chief complaint of jaundice (05 Jun 2021 11:44)    pt seen in icu [  ], reg med floor [ x  ], bed [ x ], chair at bedside [   ], a+o x3 [ x ], lethargic [  ],    nad [x  ]        Allergies    No Known Allergies        Vitals    T(F): 97.5 (06-06-21 @ 05:08), Max: 98.4 (06-05-21 @ 21:16)  HR: 69 (06-06-21 @ 05:08) (60 - 69)  BP: 109/58 (06-06-21 @ 05:08) (104/62 - 148/61)  RR: 18 (06-06-21 @ 05:08) (16 - 18)  SpO2: 99% (06-06-21 @ 05:08) (97% - 99%)  Wt(kg): --  CAPILLARY BLOOD GLUCOSE      POCT Blood Glucose.: 150 mg/dL (04 Jun 2021 11:15)      Labs                          9.4    9.57  )-----------( 376      ( 04 Jun 2021 06:51 )             28.1       06-04    136  |  103  |  27<H>  ----------------------------<  114<H>  3.6   |  23  |  1.25    Ca    9.2      04 Jun 2021 06:51  Phos  3.0     06-04  Mg     2.4     06-04    TPro  5.9<L>  /  Alb  2.1<L>  /  TBili  23.0<H>  /  DBili  19.9<H>  /  AST  202<H>  /  ALT  219<H>  /  AlkPhos  1316<H>  06-04                Radiology Results      Meds    MEDICATIONS  (STANDING):  heparin   Injectable 5000 Unit(s) SubCutaneous every 8 hours      MEDICATIONS  (PRN):  ondansetron Injectable 4 milliGRAM(s) IV Push every 6 hours PRN Nausea and/or Vomiting      Physical Exam    Neuro :  no focal deficits  Respiratory: CTA B/L  CV: RRR, S1S2, no murmurs,   Abdominal: Soft, NT, ND +BS,  Extremities: No edema, + peripheral pulses    ASSESSMENT    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 Prominently enhancing peripancreatic lymph node. Differential considerations include cholangiocarcinoma and primary neuroendocrine tumor. Pancreatic adenocarcinoma is considered less likely noted   hepatology cons noted  gi cons noted.  as per pt and family they dont want any agressive measures  palliative cons noted  MOLST drafted; DNR/DNI/no feeding tube/comfort measures only.  Pt deferred to her family for any further discussions.    Family would like pt to be discharged to [facility] given limitation of Medicare, whilst they work on transitioning insurance coverage to Medicaid.    Plan is to then have pt transferred home with hospice.  phys tx eval  cont current meds   d/c planning       Patient is a 89y old  Female who presents with a chief complaint of jaundice (05 Jun 2021 11:44)    pt seen in icu [  ], reg med floor [ x  ], bed [ x ], chair at bedside [   ], a+o x3 [ x ], lethargic [  ],    nad [x  ]        Allergies    No Known Allergies        Vitals    T(F): 97.5 (06-06-21 @ 05:08), Max: 98.4 (06-05-21 @ 21:16)  HR: 69 (06-06-21 @ 05:08) (60 - 69)  BP: 109/58 (06-06-21 @ 05:08) (104/62 - 148/61)  RR: 18 (06-06-21 @ 05:08) (16 - 18)  SpO2: 99% (06-06-21 @ 05:08) (97% - 99%)  Wt(kg): --  CAPILLARY BLOOD GLUCOSE      POCT Blood Glucose.: 150 mg/dL (04 Jun 2021 11:15)      Labs                          9.4    9.57  )-----------( 376      ( 04 Jun 2021 06:51 )             28.1       06-04    136  |  103  |  27<H>  ----------------------------<  114<H>  3.6   |  23  |  1.25    Ca    9.2      04 Jun 2021 06:51  Phos  3.0     06-04  Mg     2.4     06-04    TPro  5.9<L>  /  Alb  2.1<L>  /  TBili  23.0<H>  /  DBili  19.9<H>  /  AST  202<H>  /  ALT  219<H>  /  AlkPhos  1316<H>  06-04                Radiology Results      Meds    MEDICATIONS  (STANDING):  heparin   Injectable 5000 Unit(s) SubCutaneous every 8 hours      MEDICATIONS  (PRN):  ondansetron Injectable 4 milliGRAM(s) IV Push every 6 hours PRN Nausea and/or Vomiting      Physical Exam    Neuro :  no focal deficits  Respiratory: CTA B/L  CV: RRR, S1S2, no murmurs,   Abdominal: Soft, NT, ND +BS,  Extremities: No edema, + peripheral pulses    ASSESSMENT    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 Prominently enhancing peripancreatic lymph node. Differential considerations include cholangiocarcinoma and primary neuroendocrine tumor. Pancreatic adenocarcinoma is considered less likely noted   hepatology cons noted  gi cons noted.  as per pt and family they dont want any agressive measures  palliative cons noted  MOLST drafted; DNR/DNI/no feeding tube/comfort measures only.  Pt deferred to her family for any further discussions.    Family would like pt to be discharged to [facility] given limitation of Medicare, whilst they work on transitioning insurance coverage to Medicaid.    Plan is to then have pt transferred home with hospice.  phys tx eval noted and rec phys tx 3-5x/week x 4-6 weeks at home w/ assist  cont current meds   d/c planning

## 2021-06-06 NOTE — PROGRESS NOTE ADULT - ASSESSMENT
1. Painless jaundice  2. Pancreatic mass  3. R/o pancreatic cancer  4. Intra and extrahepatic biliary dilation  5. R/o cholangiocarcinoma    1. Antibiotics IV  2. ERCP  3. Surgical evaluation  4. Check   5. Protonix daily  6. Avoid NSAID  7. DVT prophylaxis 1. Painless jaundice  2. Pancreatic mass  3. R/o pancreatic cancer  4. Intra and extrahepatic biliary dilation  5. R/o cholangiocarcinoma    1. Antibiotics IV  2. ERCP  3. Surgical follow up  4. Patient is in hospice  5. Protonix daily  6. Avoid NSAID  7. DVT prophylaxis

## 2021-06-06 NOTE — PROGRESS NOTE ADULT - SUBJECTIVE AND OBJECTIVE BOX
[   ] ICU                                          [   ] CCU                                      [ X  ] Medical Floor    Patient is a 89 year old female with Jaundice. GI consulted to evaluate.         Patient is a 89 year old female from home AAO x3, with past medical history of Left eye blindness, who presented to the ED due to jaundice and weakness. Patient states that she noticed her skin and eyes were yellow about 3 days ago. Patient states that prior to that her skin and eyes were fine. Patient also states having some weakness for the past few days as well. Patient denies any history of any medical issues other than some left eye blindness. Denies any pain, nausea, vomiting, dizziness or headache. Spoke to daughter, Genny ([phone]) for further history. Daughter states that the patient lives alone at home but the daughter lives near by. Patient has a HHA 3days/week. Daughter states she had noticed patient's skin was yellow on Sunday.  No abdominal pain, nausea, vomiting, hematemesis, hematochezia, melena, fever, chills, chest pain, SOB, cough, hematuria or dysuria reported.     Today patient appears comfortable but lethargic. No new complaints reported, No abdominal pain, N/V, hematemesis, hematochezia, melena, fever, chills, chest pain, SOB, cough or diarrhea reported.      PAIN MANAGEMENT:  Pain Scale:                 0/10  Pain Location:      Prior Colonoscopy:  No prior colonoscopy    PAST MEDICAL HISTORY  Blind       PAST SURGICAL HISTORY  No significant past surgical history        Allergies    No Known Allergies    Intolerances    None    HOME MEDICATIONS    MEDICATIONS  (STANDING):  heparin   Injectable 5000 Unit(s) SubCutaneous every 8 hours    MEDICATIONS  (PRN):  ondansetron Injectable 4 milliGRAM(s) IV Push every 6 hours PRN Nausea and/or Vomiting      SOCIAL HISTORY  Advanced Directives:       [  ] Full Code       [ X ] DNR  Marital Status:         [  ] M      [ X ] S      [  ] D       [  ] W  Children:       [ X ] Yes      [  ] No  Occupation:        [  ] Employed       [ X ] Unemployed       [  ] Retired  Diet:       [ X ] Regular       [  ] PEG feeding          [  ] NG tube feeding  Drug Use:           [ X ] Patient denied          [  ] Yes  Alcohol:           [X  ] No             [  ] Yes (socially)         [  ] Yes (chronic)  Tobacco:           [  ] Yes           [ X ] No      FAMILY HISTORY  [X  ] Heart Disease            [ X ] Diabetes             [ X ] HTN             [  ] Colon Cancer             [  ] Stomach Cancer              [  ] Pancreatic Cancer      VITALS  Vital Signs Last 24 Hrs  T(C): 36.4 (06 Jun 2021 05:08), Max: 36.9 (05 Jun 2021 21:16)  T(F): 97.5 (06 Jun 2021 05:08), Max: 98.4 (05 Jun 2021 21:16)  HR: 69 (06 Jun 2021 05:08) (60 - 69)  BP: 109/58 (06 Jun 2021 05:08) (104/62 - 148/61)   RR: 18 (06 Jun 2021 05:08) (16 - 18)  SpO2: 99% (06 Jun 2021 05:08) (97% - 99%)       MEDICATIONS  (STANDING):  heparin   Injectable 5000 Unit(s) SubCutaneous every 8 hours    MEDICATIONS  (PRN):  ondansetron Injectable 4 milliGRAM(s) IV Push every 6 hours PRN Nausea and/or Vomiting        Complete Blood Count + Automated Diff (06.04.21 @ 06:51)   WBC Count: 9.57 K/uL   RBC Count: 3.09 M/uL   Hemoglobin: 9.4 g/dL   Hematocrit: 28.1 %   Mean Cell Volume: 90.9 fl   Mean Cell Hemoglobin: 30.4 pg   Mean Cell Hemoglobin Conc: 33.5 gm/dL   Red Cell Distrib Width: 21.9 %   Platelet Count - Automated: 376 K/uL   Auto Neutrophil #: 7.58 K/uL   Auto Lymphocyte #: 1.00 K/uL   Auto Monocyte #: 0.62 K/uL   Auto Eosinophil #: 0.15 K/uL   Auto Basophil #: 0.10 K/uL   Auto Neutrophil %: 79.2: Manual diff was performed w-in 72 hrs.   Differential percentages must be correlated with absolute numbers for   clinical significance. %   Auto Lymphocyte %: 10.4 %   Auto Monocyte %: 6.5 %   Auto Eosinophil %: 1.6 %   Auto Basophil %: 1.0 %   Auto Immature Granulocyte %: 1.3: (Includes meta, myelo and promyelocytes) %   Nucleated RBC: 0 /100 WBCs     Comprehensive Metabolic Panel (06.04.21 @ 06:51)   Sodium, Serum: 136 mmol/L   Potassium, Serum: 3.6 mmol/L   Chloride, Serum: 103 mmol/L   Carbon Dioxide, Serum: 23 mmol/L   Anion Gap, Serum: 10 mmol/L   Blood Urea Nitrogen, Serum: 27 mg/dL   Creatinine, Serum: 1.25 mg/dL   Glucose, Serum: 114 mg/dL   Calcium, Total Serum: 9.2 mg/dL   Protein Total, Serum: 5.9 g/dL   Albumin, Serum: 2.1 g/dL   Bilirubin Total, Serum: 23.0 mg/dL   Alkaline Phosphatase, Serum: 1316 U/L   Aspartate Aminotransferase (AST/SGOT): 202 U/L   Alanine Aminotransferase (ALT/SGPT): 219 U/L DA

## 2021-06-06 NOTE — PROGRESS NOTE ADULT - MUSCULOSKELETAL
Telephone Encounter by Allison Jones MD at 03/03/17 07:39 AM     Author:  Allison Jones MD Service:  (none) Author Type:  Physician     Filed:  03/03/17 07:39 AM Encounter Date:  3/2/2017 Status:  Signed     :  Allison Jones MD (Physician)            note closed.can be faxed.[AG1.1M]      Revision History        User Key Date/Time User Provider Type Action    > AG1.1 03/03/17 07:39 AM Allison Jones MD Physician Sign    M - Manual             no joint swelling/no joint erythema/no joint warmth/no calf tenderness details… detailed exam

## 2021-06-06 NOTE — DIETITIAN INITIAL EVALUATION ADULT. - OTHER INFO
no information on wt loss available. reports adequate oral intake in-house. No complaints of Nausea, vomiting or diarrhea. No problems with chewing or swallowing food . No food allergies. Provide Regular diet as ordered. Palliative involved, home hospice is being considered.

## 2021-06-07 RX ORDER — HYDROXYZINE HCL 10 MG
25 TABLET ORAL EVERY 8 HOURS
Refills: 0 | Status: DISCONTINUED | OUTPATIENT
Start: 2021-06-07 | End: 2021-06-09

## 2021-06-07 RX ORDER — HYDROXYZINE HCL 10 MG
1 TABLET ORAL
Qty: 42 | Refills: 0
Start: 2021-06-07 | End: 2021-06-20

## 2021-06-07 RX ORDER — DIPHENHYDRAMINE HCL 50 MG
25 CAPSULE ORAL EVERY 6 HOURS
Refills: 0 | Status: DISCONTINUED | OUTPATIENT
Start: 2021-06-07 | End: 2021-06-07

## 2021-06-07 RX ADMIN — Medication 25 MILLIGRAM(S): at 11:05

## 2021-06-07 RX ADMIN — HEPARIN SODIUM 5000 UNIT(S): 5000 INJECTION INTRAVENOUS; SUBCUTANEOUS at 06:17

## 2021-06-07 RX ADMIN — HEPARIN SODIUM 5000 UNIT(S): 5000 INJECTION INTRAVENOUS; SUBCUTANEOUS at 21:03

## 2021-06-07 RX ADMIN — HEPARIN SODIUM 5000 UNIT(S): 5000 INJECTION INTRAVENOUS; SUBCUTANEOUS at 13:16

## 2021-06-07 NOTE — PROGRESS NOTE ADULT - SUBJECTIVE AND OBJECTIVE BOX
Patient is a 89y old  Female who presents with a chief complaint of jaundice (06 Jun 2021 12:35)    pt seen in icu [  ], reg med floor [ x  ], bed [ x ], chair at bedside [   ], a+o x3 [ x ], lethargic [  ],    nad [x  ]      Allergies    No Known Allergies        Vitals    T(F): 97.8 (06-07-21 @ 05:03), Max: 98.3 (06-06-21 @ 12:19)  HR: 71 (06-07-21 @ 05:03) (60 - 71)  BP: 115/66 (06-07-21 @ 05:03) (115/66 - 123/62)  RR: 18 (06-07-21 @ 05:03) (18 - 18)  SpO2: 98% (06-07-21 @ 05:03) (98% - 100%)  Wt(kg): --  CAPILLARY BLOOD GLUCOSE      POCT Blood Glucose.: 79 mg/dL (06 Jun 2021 08:09)      Labs                          Radiology Results      Meds    MEDICATIONS  (STANDING):  heparin   Injectable 5000 Unit(s) SubCutaneous every 8 hours      MEDICATIONS  (PRN):  ondansetron Injectable 4 milliGRAM(s) IV Push every 6 hours PRN Nausea and/or Vomiting      Physical Exam    Neuro :  no focal deficits  Respiratory: CTA B/L  CV: RRR, S1S2, no murmurs,   Abdominal: Soft, NT, ND +BS,  Extremities: No edema, + peripheral pulses    ASSESSMENT    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 Prominently enhancing peripancreatic lymph node. Differential considerations include cholangiocarcinoma and primary neuroendocrine tumor. Pancreatic adenocarcinoma is considered less likely noted   hepatology cons noted  gi cons noted.  as per pt and family they dont want any agressive measures  palliative cons noted  MOLST drafted; DNR/DNI/no feeding tube/comfort measures only.  Pt deferred to her family for any further discussions.    Family would like pt to be discharged to [facility] given limitation of Medicare, whilst they work on transitioning insurance coverage to Medicaid.    Plan is to then have pt transferred home with hospice.  phys tx eval noted and rec phys tx 3-5x/week x 4-6 weeks at home w/ assist  cont current meds   d/c planning       Patient is a 89y old  Female who presents with a chief complaint of jaundice (06 Jun 2021 12:35)    pt seen in icu [  ], reg med floor [ x  ], bed [ x ], chair at bedside [   ], a+o x3 [ x ], lethargic [  ],    nad [x  ]      Allergies    No Known Allergies        Vitals    T(F): 97.8 (06-07-21 @ 05:03), Max: 98.3 (06-06-21 @ 12:19)  HR: 71 (06-07-21 @ 05:03) (60 - 71)  BP: 115/66 (06-07-21 @ 05:03) (115/66 - 123/62)  RR: 18 (06-07-21 @ 05:03) (18 - 18)  SpO2: 98% (06-07-21 @ 05:03) (98% - 100%)  Wt(kg): --  CAPILLARY BLOOD GLUCOSE      POCT Blood Glucose.: 79 mg/dL (06 Jun 2021 08:09)      Labs                          Radiology Results      Meds    MEDICATIONS  (STANDING):  heparin   Injectable 5000 Unit(s) SubCutaneous every 8 hours      MEDICATIONS  (PRN):  ondansetron Injectable 4 milliGRAM(s) IV Push every 6 hours PRN Nausea and/or Vomiting      Physical Exam    Neuro :  no focal deficits  Respiratory: CTA B/L  CV: RRR, S1S2, no murmurs,   Abdominal: Soft, NT, ND +BS,  Extremities: No edema, + peripheral pulses    ASSESSMENT    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 Prominently enhancing peripancreatic lymph node. Differential considerations include cholangiocarcinoma and primary neuroendocrine tumor. Pancreatic adenocarcinoma is considered less likely noted   hepatology cons noted  gi cons noted.  as per pt and family they dont want any agressive measures  palliative cons noted  MOLST drafted; DNR/DNI/no feeding tube/comfort measures only.  Pt deferred to her family for any further discussions.    Family would like pt to be discharged to [facility] given limitation of Medicare, whilst they work on transitioning insurance coverage to Medicaid.    Plan is to then have pt transferred home with hospice.  phys tx eval noted and rec phys tx 3-5x/week x 4-6 weeks at home w/ assist   add atarax 25mg q8 prn itching  cont current meds   d/c planning

## 2021-06-07 NOTE — H&P ADULT - ATTENDING SUPERVISION STATEMENT
6/7/2021   CARE MANAGEMENT NOTE:  Pt has been accepted to [facility] and a skilled bed is available today. RN, please call report to [phone]. Pt will go to room 52A. [facility] transport has been arranged for 4:50 p.m but call gave an updated time of 6 p.m. Transition of Care Plan to SNF/Rehab 
 
SNF/Rehab Transition: 
Patient has been accepted to [facility] and meets criteria for admission. Patient will transported by [facility] and expected to leave at 4:50 p.m. Communication to Patient/Family: 
Dtr (identified care giver) is aware and agreeable to the transition plan. Communication to SNF/Rehab: 
Bedside RN, Renee, has been notified to update the transition plan to the facility and call report (phone number [phone]. Discharge information has been updated on the AVS. Discharge instructions to be fax'd to facility at [facility] # [phone]). Nursing Please include all hard scripts for controlled substances, med rec and dc summary, and AVS in packet. Reviewed and confirmed with facility, [facility], can manage the patient care needs for the following: SNF/Rehab Transition: 
Patient to follow-up with Home Health: [facility], other ,none) PCP/Specialist: Dr. Jeff Van Reviewed and confirmed with [facility] [place] they can manage the patient care needs for the following:  
 
Sugey Smalls with (X) only those applicable: 
 
Medication: 
[]  Medications will be available at the facility []  IV Antibiotics N/A 
[]  Controlled Substance - hard copy to be sent with patient  
[]  Weekly Labs Documents: 
[] Hard RX [x] MAR 
[] Kardex [x] AVS 
[x]Transfer Summary []Discharge Equipment: 
[]  CPAP/BiPAP []  Wound Vacuum 
[]  Eckert or Urinary Device 
[]  PICC/Central Line 
[]  Nebulizer 
[]  Ventilator Treatment: 
[]Isolation (for MRSA, VRE, etc.) []Surgical Drain Management []Tracheostomy Care 
[]Dressing Changes []Dialysis with transportation and chair time N/A 
[]PEG Care Resident []Oxygen []Daily Weights for Heart Failure Dietary: 
[]Any diet limitations []Tube Feedings []Total Parenteral Management (TPN) Eligible for Medicaid Long Term Services and Supports Yes: 
[] Eligible for medical assistance or will become eligible within 180 days and UAI completed. [] Provider/Patient and/or support system has requested screening. [] UAI copy provided to patient or responsible party, N/A 
[] UAI unavailable at discharge will send once processed to SNF provider. [] UAI unavailable at discharged mailed to patient No:  
[] Private pay and is not financially eligible for Medicaid within the next 180 days. [] Reside out-of-state. [] A residents of a state owned/operated facility that is licensed 
by [facility] or [facility] 
[] Enrollment in [facility] hospice services 
[]  [address] 
[] Patient /Family declines to have screening completed or provide financial information for screening Financial Resources: 
Medicaid   
[] Initiated and application pending  
[] Full coverage Advanced Care Plan: 
[]Surrogate Decision Maker of Care 
[]POA []Communicated Code Status DNR (DDNR\", \"Full\") Other Financial Resources: 
Medicaid   
[] Initiated and application pending  
[] Full coverage Advanced Care Plan: 
[]Surrogate Decision Maker of Care 
[]POA []Communicated Code Status DNR (DDNR\", \"Full\") Other COVID 19 test for placement was negative on 6/3. A Rapid will be done today. SNF admissions is aware of dtr's request for a private room as soon as available. Rashmi

## 2021-06-07 NOTE — PROGRESS NOTE ADULT - PROBLEM SELECTOR PLAN 1
patient presented due to jaundice of skin and eyes  noted to have elevated LFTs on admission   CT abdomen showing infiltrating relatively hyperenhancing mass in the pancreatic head, extending to the pancreatic duodenal groove, with resulting severe intra and extrahepatic biliary dilatation. Prominently enhancing peripancreatic lymph node  GI, Dr. Garcia consulted  hepatology, Dr. Nuñez consulted   surgical oncology, Dr. Marrero consulted   monitor CMP daily  surgery recommending ERCP.   palliative discussed with the family and patient is for home hospice, patient presented due to jaundice of skin and eyes  noted to have elevated LFTs on admission   CT abdomen showing infiltrating relatively hyperenhancing mass in the pancreatic head, extending to the pancreatic duodenal groove, with resulting severe intra and extrahepatic biliary dilatation. Prominently enhancing peripancreatic lymph node  GI, Dr. Garcia consulted  hepatology, Dr. Nuñez consulted   surgical oncology, Dr. Marrero consulted   surgery recommending ERCP.   palliative discussed with the family and patient is for home hospice,

## 2021-06-07 NOTE — PROGRESS NOTE ADULT - SUBJECTIVE AND OBJECTIVE BOX
PGY-1 Progress Note discussed with attending    PAGER #: [[phone]] TILL 5:00 PM  PLEASE CONTACT ON CALL TEAM:  - On Call Team (Please refer to Bryant) FROM 5:00 PM - 8:30PM  - Nightfloat Team FROM 8:30 -7:30 AM    CHIEF COMPLAINT & BRIEF HOSPITAL COURSE:    INTERVAL HPI/OVERNIGHT EVENTS:       REVIEW OF SYSTEMS:  CONSTITUTIONAL: No fever, weight loss, or fatigue  RESPIRATORY: No cough, wheezing, chills or hemoptysis; No shortness of breath  CARDIOVASCULAR: No chest pain, palpitations, dizziness, or leg swelling  GASTROINTESTINAL: No abdominal pain. No nausea, vomiting, or hematemesis; No diarrhea or constipation. No melena or hematochezia.  GENITOURINARY: No dysuria or hematuria, urinary frequency  NEUROLOGICAL: No headaches, memory loss, loss of strength, numbness, or tremors  SKIN: No itching, burning, rashes, or lesions     MEDICATIONS  (STANDING):  heparin   Injectable 5000 Unit(s) SubCutaneous every 8 hours    MEDICATIONS  (PRN):  diphenhydrAMINE 25 milliGRAM(s) Oral every 6 hours PRN Rash and/or Itching  ondansetron Injectable 4 milliGRAM(s) IV Push every 6 hours PRN Nausea and/or Vomiting      Vital Signs Last 24 Hrs  T(C): 36.6 (07 Jun 2021 05:03), Max: 36.8 (06 Jun 2021 12:19)  T(F): 97.8 (07 Jun 2021 05:03), Max: 98.3 (06 Jun 2021 12:19)  HR: 71 (07 Jun 2021 05:03) (60 - 71)  BP: 115/66 (07 Jun 2021 05:03) (115/66 - 123/62)  BP(mean): --  RR: 18 (07 Jun 2021 05:03) (18 - 18)  SpO2: 98% (07 Jun 2021 05:03) (98% - 100%)    PHYSICAL EXAMINATION:  GENERAL: NAD, well built  HEAD:  Atraumatic, Normocephalic  EYES:  conjunctiva and sclera clear  NECK: Supple, No JVD, Normal thyroid  CHEST/LUNG: Clear to auscultation. Clear to percussion bilaterally; No rales, rhonchi, wheezing, or rubs  HEART: Regular rate and rhythm; No murmurs, rubs, or gallops  ABDOMEN: Soft, Nontender, Nondistended; Bowel sounds present  NERVOUS SYSTEM:  Alert & Oriented X3,    EXTREMITIES:  2+ Peripheral Pulses, No clubbing, cyanosis, or edema  SKIN: warm dry                          CAPILLARY BLOOD GLUCOSE  CAPILLARY BLOOD GLUCOSE      POCT Blood Glucose.: 79 mg/dL (06 Jun 2021 08:09)    CAPILLARY BLOOD GLUCOSE          RADIOLOGY & ADDITIONAL TESTS:                   PGY-1 Progress Note discussed with attending    PAGER #: [[phone]] TILL 5:00 PM  PLEASE CONTACT ON CALL TEAM:  - On Call Team (Please refer to Bryant) FROM 5:00 PM - 8:30PM  - Nightfloat Team FROM 8:30 -7:30 AM        INTERVAL HPI/OVERNIGHT EVENTS:   patient examined bed side, she is for home hospice, no intervention as per family , she c/o itching , ordered benadryl as needed    REVIEW OF SYSTEMS:  CONSTITUTIONAL: No fever, weight loss, or fatigue  RESPIRATORY: No cough, wheezing, chills or hemoptysis; No shortness of breath  CARDIOVASCULAR: No chest pain, palpitations, dizziness, or leg swelling  GASTROINTESTINAL: No abdominal pain. No nausea, vomiting, or hematemesis; No diarrhea or constipation. No melena or hematochezia.  GENITOURINARY: No dysuria or hematuria, urinary frequency  NEUROLOGICAL: No headaches, memory loss, loss of strength, numbness, or tremors  SKIN: +itching, burning, rashes, or lesions     MEDICATIONS  (STANDING):  heparin   Injectable 5000 Unit(s) SubCutaneous every 8 hours    MEDICATIONS  (PRN):  diphenhydrAMINE 25 milliGRAM(s) Oral every 6 hours PRN Rash and/or Itching  ondansetron Injectable 4 milliGRAM(s) IV Push every 6 hours PRN Nausea and/or Vomiting      Vital Signs Last 24 Hrs  T(C): 36.6 (07 Jun 2021 05:03), Max: 36.8 (06 Jun 2021 12:19)  T(F): 97.8 (07 Jun 2021 05:03), Max: 98.3 (06 Jun 2021 12:19)  HR: 71 (07 Jun 2021 05:03) (60 - 71)  BP: 115/66 (07 Jun 2021 05:03) (115/66 - 123/62)  BP(mean): --  RR: 18 (07 Jun 2021 05:03) (18 - 18)  SpO2: 98% (07 Jun 2021 05:03) (98% - 100%)    PHYSICAL EXAMINATION:  GENERAL: cachectic, jaundiced.   HEAD:  Atraumatic, Normocephalic  EYES:  conjunctiva and sclera icterus  NECK: Supple, No JVD, Normal thyroid  CHEST/LUNG: Clear to auscultation. Clear to percussion bilaterally; No rales, rhonchi, wheezing, or rubs  HEART: Regular rate and rhythm; No murmurs, rubs, or gallops  ABDOMEN: Soft, Nontender, Nondistended; Bowel sounds present  NERVOUS SYSTEM:  Alert & Oriented X3,    EXTREMITIES:  2+ Peripheral Pulses, No clubbing, cyanosis, or edema  SKIN: warm dry, icteric                          CAPILLARY BLOOD GLUCOSE  CAPILLARY BLOOD GLUCOSE      POCT Blood Glucose.: 79 mg/dL (06 Jun 2021 08:09)    CAPILLARY BLOOD GLUCOSE          RADIOLOGY & ADDITIONAL TESTS:

## 2021-06-07 NOTE — PROGRESS NOTE ADULT - ASSESSMENT
1. Painless jaundice  2. Pancreatic mass  3. R/o pancreatic cancer  4. Intra and extrahepatic biliary dilation  5. R/o cholangiocarcinoma    1. Antibiotics IV  2. ERCP  3. Surgical follow up  4. Patient is in hospice  5. Protonix daily  6. Avoid NSAID  7. DVT prophylaxis

## 2021-06-07 NOTE — PROGRESS NOTE ADULT - SUBJECTIVE AND OBJECTIVE BOX
[   ] ICU                                          [   ] CCU                                      [  X ] Medical Floor    Patient is a 89 year old female with Jaundice. GI consulted to evaluate.         Patient is a 89 year old female from home AAO x3, with past medical history of Left eye blindness, who presented to the ED due to jaundice and weakness. Patient states that she noticed her skin and eyes were yellow about 3 days ago. Patient states that prior to that her skin and eyes were fine. Patient also states having some weakness for the past few days as well. Patient denies any history of any medical issues other than some left eye blindness. Denies any pain, nausea, vomiting, dizziness or headache. Spoke to daughter, Genny ([phone]) for further history. Daughter states that the patient lives alone at home but the daughter lives near by. Patient has a HHA 3days/week. Daughter states she had noticed patient's skin was yellow on Sunday.  No abdominal pain, nausea, vomiting, hematemesis, hematochezia, melena, fever, chills, chest pain, SOB, cough, hematuria or dysuria reported.     Today patient appears comfortable but lethargic. No new complaints reported, No abdominal pain, N/V, hematemesis, hematochezia, melena, fever, chills, chest pain, SOB, cough or diarrhea reported.      PAIN MANAGEMENT:  Pain Scale:                 0/10  Pain Location:      Prior Colonoscopy:  No prior colonoscopy    PAST MEDICAL HISTORY  Blind       PAST SURGICAL HISTORY  No significant past surgical history        Allergies    No Known Allergies    Intolerances    None       MEDICATIONS  (STANDING):  heparin   Injectable 5000 Unit(s) SubCutaneous every 8 hours    MEDICATIONS  (PRN):  ondansetron Injectable 4 milliGRAM(s) IV Push every 6 hours PRN Nausea and/or Vomiting      SOCIAL HISTORY  Advanced Directives:       [  ] Full Code       [ X ] DNR  Marital Status:         [  ] M      [ X ] S      [  ] D       [  ] W  Children:       [ X ] Yes      [  ] No  Occupation:        [  ] Employed       [ X ] Unemployed       [  ] Retired  Diet:       [ X ] Regular       [  ] PEG feeding          [  ] NG tube feeding  Drug Use:           [ X ] Patient denied          [  ] Yes  Alcohol:           [X  ] No             [  ] Yes (socially)         [  ] Yes (chronic)  Tobacco:           [  ] Yes           [ X ] No      FAMILY HISTORY  [X  ] Heart Disease            [ X ] Diabetes             [ X ] HTN             [  ] Colon Cancer             [  ] Stomach Cancer              [  ] Pancreatic Cancer        VITALS  Vital Signs Last 24 Hrs  T(C): 37.1 (07 Jun 2021 13:54), Max: 37.1 (07 Jun 2021 13:54)  T(F): 98.8 (07 Jun 2021 13:54), Max: 98.8 (07 Jun 2021 13:54)  HR: 67 (07 Jun 2021 13:54) (61 - 71)  BP: 132/55 (07 Jun 2021 13:54) (115/66 - 133/61)   RR: 18 (07 Jun 2021 13:54) (18 - 18)  SpO2: 100% (07 Jun 2021 13:54) (98% - 100%)       MEDICATIONS  (STANDING):  heparin   Injectable 5000 Unit(s) SubCutaneous every 8 hours    MEDICATIONS  (PRN):  hydrOXYzine hydrochloride 25 milliGRAM(s) Oral every 8 hours PRN Itching  ondansetron Injectable 4 milliGRAM(s) IV Push every 6 hours PRN Nausea and/or Vomiting

## 2021-06-08 RX ADMIN — HEPARIN SODIUM 5000 UNIT(S): 5000 INJECTION INTRAVENOUS; SUBCUTANEOUS at 05:06

## 2021-06-08 RX ADMIN — HEPARIN SODIUM 5000 UNIT(S): 5000 INJECTION INTRAVENOUS; SUBCUTANEOUS at 21:07

## 2021-06-08 RX ADMIN — Medication 25 MILLIGRAM(S): at 13:44

## 2021-06-08 RX ADMIN — HEPARIN SODIUM 5000 UNIT(S): 5000 INJECTION INTRAVENOUS; SUBCUTANEOUS at 13:43

## 2021-06-08 NOTE — PROGRESS NOTE ADULT - SUBJECTIVE AND OBJECTIVE BOX
PGY-1 Progress Note discussed with attending    PAGER #: [[phone]] TILL 5:00 PM  PLEASE CONTACT ON CALL TEAM:  - On Call Team (Please refer to Bryant) FROM 5:00 PM - 8:30PM  - Nightfloat Team FROM 8:30 -7:30 AM    CHIEF COMPLAINT & BRIEF HOSPITAL COURSE:    INTERVAL HPI/OVERNIGHT EVENTS:       REVIEW OF SYSTEMS:  CONSTITUTIONAL: No fever, weight loss, or fatigue  RESPIRATORY: No cough, wheezing, chills or hemoptysis; No shortness of breath  CARDIOVASCULAR: No chest pain, palpitations, dizziness, or leg swelling  GASTROINTESTINAL: No abdominal pain. No nausea, vomiting, or hematemesis; No diarrhea or constipation. No melena or hematochezia.  GENITOURINARY: No dysuria or hematuria, urinary frequency  NEUROLOGICAL: No headaches, memory loss, loss of strength, numbness, or tremors  SKIN: No itching, burning, rashes, or lesions     MEDICATIONS  (STANDING):  heparin   Injectable 5000 Unit(s) SubCutaneous every 8 hours    MEDICATIONS  (PRN):  hydrOXYzine hydrochloride 25 milliGRAM(s) Oral every 8 hours PRN Itching  ondansetron Injectable 4 milliGRAM(s) IV Push every 6 hours PRN Nausea and/or Vomiting      Vital Signs Last 24 Hrs  T(C): 36.9 (08 Jun 2021 05:35), Max: 37.1 (07 Jun 2021 13:54)  T(F): 98.4 (08 Jun 2021 05:35), Max: 98.8 (07 Jun 2021 13:54)  HR: 64 (08 Jun 2021 05:35) (63 - 67)  BP: 108/46 (08 Jun 2021 05:35) (108/46 - 133/61)  BP(mean): --  RR: 16 (08 Jun 2021 05:35) (16 - 18)  SpO2: 99% (08 Jun 2021 05:35) (97% - 100%)    PHYSICAL EXAMINATION:  GENERAL: NAD, well built  HEAD:  Atraumatic, Normocephalic  EYES:  conjunctiva and sclera clear  NECK: Supple, No JVD, Normal thyroid  CHEST/LUNG: Clear to auscultation. Clear to percussion bilaterally; No rales, rhonchi, wheezing, or rubs  HEART: Regular rate and rhythm; No murmurs, rubs, or gallops  ABDOMEN: Soft, Nontender, Nondistended; Bowel sounds present  NERVOUS SYSTEM:  Alert & Oriented X3,    EXTREMITIES:  2+ Peripheral Pulses, No clubbing, cyanosis, or edema  SKIN: warm dry                          CAPILLARY BLOOD GLUCOSE  CAPILLARY BLOOD GLUCOSE        CAPILLARY BLOOD GLUCOSE          RADIOLOGY & ADDITIONAL TESTS:                   PGY-1 Progress Note discussed with attending    PAGER #: [[phone]] TILL 5:00 PM  PLEASE CONTACT ON CALL TEAM:  - On Call Team (Please refer to Bryant) FROM 5:00 PM - 8:30PM  - Nightfloat Team FROM 8:30 -7:30 AM        INTERVAL HPI/OVERNIGHT EVENTS:   patient examined bedside, she is comfortable , c/o itching , she is for home hospice    REVIEW OF SYSTEMS:  CONSTITUTIONAL: No fever, weight loss, or fatigue  RESPIRATORY: No cough, wheezing, chills or hemoptysis; No shortness of breath  CARDIOVASCULAR: No chest pain, palpitations, dizziness, or leg swelling  GASTROINTESTINAL: No abdominal pain. No nausea, vomiting, or hematemesis; No diarrhea or constipation. No melena or hematochezia.  GENITOURINARY: No dysuria or hematuria, urinary frequency  NEUROLOGICAL: No headaches, memory loss, loss of strength, numbness, or tremors  SKIN: No itching, burning, rashes, or lesions     MEDICATIONS  (STANDING):  heparin   Injectable 5000 Unit(s) SubCutaneous every 8 hours    MEDICATIONS  (PRN):  hydrOXYzine hydrochloride 25 milliGRAM(s) Oral every 8 hours PRN Itching  ondansetron Injectable 4 milliGRAM(s) IV Push every 6 hours PRN Nausea and/or Vomiting      Vital Signs Last 24 Hrs  T(C): 36.9 (08 Jun 2021 05:35), Max: 37.1 (07 Jun 2021 13:54)  T(F): 98.4 (08 Jun 2021 05:35), Max: 98.8 (07 Jun 2021 13:54)  HR: 64 (08 Jun 2021 05:35) (63 - 67)  BP: 108/46 (08 Jun 2021 05:35) (108/46 - 133/61)  BP(mean): --  RR: 16 (08 Jun 2021 05:35) (16 - 18)  SpO2: 99% (08 Jun 2021 05:35) (97% - 100%)        PHYSICAL EXAMINATION:    GENERAL: cachectic, jaundiced.   HEAD:  Atraumatic, Normocephalic  EYES:  conjunctiva and sclera icterus  NECK: Supple, No JVD, Normal thyroid  CHEST/LUNG: Clear to auscultation. Clear to percussion bilaterally; No rales, rhonchi, wheezing, or rubs  HEART: Regular rate and rhythm; No murmurs, rubs, or gallops  ABDOMEN: Soft, Nontender, Nondistended; Bowel sounds present  NERVOUS SYSTEM:  Alert & Oriented X3,    EXTREMITIES:  2+ Peripheral Pulses, No clubbing, cyanosis, or edema  SKIN: warm dry, icteric                        CAPILLARY BLOOD GLUCOSE  CAPILLARY BLOOD GLUCOSE        CAPILLARY BLOOD GLUCOSE          RADIOLOGY & ADDITIONAL TESTS:

## 2021-06-08 NOTE — PROGRESS NOTE ADULT - PROBLEM SELECTOR PLAN 1
patient presented due to jaundice of skin and eyes  noted to have elevated LFTs on admission   CT abdomen showing infiltrating relatively hyperenhancing mass in the pancreatic head, extending to the pancreatic duodenal groove, with resulting severe intra and extrahepatic biliary dilatation. Prominently enhancing peripancreatic lymph node  GI, Dr. Garcia consulted  hepatology, Dr. Nuñez consulted   surgical oncology, Dr. Marrero consulted   surgery recommending ERCP.   palliative discussed with the family and patient is for home hospice, patient presented due to jaundice of skin and eyes  noted to have elevated LFTs on admission   CT abdomen showing infiltrating relatively hyperenhancing mass in the pancreatic head, extending to the pancreatic duodenal groove, with resulting severe intra and extrahepatic biliary dilatation. Prominently enhancing peripancreatic lymph node  GI, Dr. Garcia consulted  hepatology, Dr. Nuñez consulted   surgical oncology, Dr. Marrero consulted   surgery recommending ERCP.   palliative discussed with the family and patient is for home hospice,  atarax for itching,

## 2021-06-08 NOTE — PROGRESS NOTE ADULT - SUBJECTIVE AND OBJECTIVE BOX
Patient is a 89y old  Female who presents with a chief complaint of jaundice (07 Jun 2021 17:09)    pt seen in icu [  ], reg med floor [ x  ], bed [ x ], chair at bedside [   ], a+o x3 [ x ], lethargic [  ],    nad [x  ]        Allergies    No Known Allergies        Vitals    T(F): 98.4 (06-08-21 @ 05:35), Max: 98.8 (06-07-21 @ 13:54)  HR: 64 (06-08-21 @ 05:35) (63 - 67)  BP: 108/46 (06-08-21 @ 05:35) (108/46 - 133/61)  RR: 16 (06-08-21 @ 05:35) (16 - 18)  SpO2: 99% (06-08-21 @ 05:35) (97% - 100%)  Wt(kg): --  CAPILLARY BLOOD GLUCOSE      POCT Blood Glucose.: 79 mg/dL (06 Jun 2021 08:09)      Labs                          Radiology Results      Meds    MEDICATIONS  (STANDING):  heparin   Injectable 5000 Unit(s) SubCutaneous every 8 hours      MEDICATIONS  (PRN):  hydrOXYzine hydrochloride 25 milliGRAM(s) Oral every 8 hours PRN Itching  ondansetron Injectable 4 milliGRAM(s) IV Push every 6 hours PRN Nausea and/or Vomiting      Physical Exam    Neuro :  no focal deficits  Respiratory: CTA B/L  CV: RRR, S1S2, no murmurs,   Abdominal: Soft, NT, ND +BS,  Extremities: No edema, + peripheral pulses    ASSESSMENT    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 Prominently enhancing peripancreatic lymph node. Differential considerations include cholangiocarcinoma and primary neuroendocrine tumor. Pancreatic adenocarcinoma is considered less likely noted   hepatology cons noted  gi cons noted.  as per pt and family they dont want any agressive measures  palliative cons noted  MOLST drafted; DNR/DNI/no feeding tube/comfort measures only.  Pt deferred to her family for any further discussions.    Family would like pt to be discharged to [facility] given limitation of Medicare, whilst they work on transitioning insurance coverage to Medicaid.    Plan is to then have pt transferred home with hospice.  phys tx eval noted and rec phys tx 3-5x/week x 4-6 weeks at home w/ assist   add atarax 25mg q8 prn itching  cont current meds   d/c planning       Patient is a 89y old  Female who presents with a chief complaint of jaundice (07 Jun 2021 17:09)    pt seen in icu [  ], reg med floor [ x  ], bed [ x ], chair at bedside [   ], a+o x3 [ x ], lethargic [  ],    nad [x  ]        Allergies    No Known Allergies        Vitals    T(F): 98.4 (06-08-21 @ 05:35), Max: 98.8 (06-07-21 @ 13:54)  HR: 64 (06-08-21 @ 05:35) (63 - 67)  BP: 108/46 (06-08-21 @ 05:35) (108/46 - 133/61)  RR: 16 (06-08-21 @ 05:35) (16 - 18)  SpO2: 99% (06-08-21 @ 05:35) (97% - 100%)  Wt(kg): --  CAPILLARY BLOOD GLUCOSE      POCT Blood Glucose.: 79 mg/dL (06 Jun 2021 08:09)      Labs                          Radiology Results      Meds    MEDICATIONS  (STANDING):  heparin   Injectable 5000 Unit(s) SubCutaneous every 8 hours      MEDICATIONS  (PRN):  hydrOXYzine hydrochloride 25 milliGRAM(s) Oral every 8 hours PRN Itching  ondansetron Injectable 4 milliGRAM(s) IV Push every 6 hours PRN Nausea and/or Vomiting      Physical Exam    Neuro :  no focal deficits  Respiratory: CTA B/L  CV: RRR, S1S2, no murmurs,   Abdominal: Soft, NT, ND +BS,  Extremities: No edema, + peripheral pulses    ASSESSMENT    painless jaundice 2nd to pancreatic head mass   liver failure,   anemia      PLAN      ct abd pelv with Infiltrating relatively hyperenhancing mass in the pancreatic head, extending to the pancreatic duodenal groove, with resulting severe intra and extrahepatic biliary dilatation. Prominently enhancing peripancreatic lymph node. Differential considerations include cholangiocarcinoma and primary neuroendocrine tumor. Pancreatic adenocarcinoma is considered less likely noted   hepatology cons noted  gi cons noted.  as per pt and family they dont want any agressive measures  palliative cons noted  MOLST drafted; DNR/DNI/no feeding tube/comfort measures only.  Pt deferred to her family for any further discussions.    Family would like pt to be discharged to [facility] given limitation of Medicare, whilst they work on transitioning insurance coverage to Medicaid.    Plan is to then have pt transferred home with hospice.  phys tx eval noted and rec phys tx 3-5x/week x 4-6 weeks at home w/ assist   cont atarax 25mg q8 prn itching  cont current meds   d/c planning for possible home hospice

## 2021-06-09 VITALS
SYSTOLIC BLOOD PRESSURE: 115 MMHG | RESPIRATION RATE: 18 BRPM | TEMPERATURE: 98 F | OXYGEN SATURATION: 96 % | DIASTOLIC BLOOD PRESSURE: 63 MMHG | HEART RATE: 71 BPM

## 2021-06-09 LAB
GLUCOSE BLDC GLUCOMTR-MCNC: 111 MG/DL — HIGH (ref 70–99)
SARS-COV-2 RNA SPEC QL NAA+PROBE: SIGNIFICANT CHANGE UP

## 2021-06-09 PROCEDURE — 87635 SARS-COV-2 COVID-19 AMP PRB: CPT

## 2021-06-09 PROCEDURE — 82962 GLUCOSE BLOOD TEST: CPT

## 2021-06-09 PROCEDURE — 97116 GAIT TRAINING THERAPY: CPT

## 2021-06-09 PROCEDURE — 82607 VITAMIN B-12: CPT

## 2021-06-09 PROCEDURE — 83010 ASSAY OF HAPTOGLOBIN QUANT: CPT

## 2021-06-09 PROCEDURE — 82378 CARCINOEMBRYONIC ANTIGEN: CPT

## 2021-06-09 PROCEDURE — 84100 ASSAY OF PHOSPHORUS: CPT

## 2021-06-09 PROCEDURE — 82746 ASSAY OF FOLIC ACID SERUM: CPT

## 2021-06-09 PROCEDURE — 85610 PROTHROMBIN TIME: CPT

## 2021-06-09 PROCEDURE — 83540 ASSAY OF IRON: CPT

## 2021-06-09 PROCEDURE — 97530 THERAPEUTIC ACTIVITIES: CPT

## 2021-06-09 PROCEDURE — 82248 BILIRUBIN DIRECT: CPT

## 2021-06-09 PROCEDURE — 86769 SARS-COV-2 COVID-19 ANTIBODY: CPT

## 2021-06-09 PROCEDURE — 93005 ELECTROCARDIOGRAM TRACING: CPT

## 2021-06-09 PROCEDURE — 82306 VITAMIN D 25 HYDROXY: CPT

## 2021-06-09 PROCEDURE — 36415 COLL VENOUS BLD VENIPUNCTURE: CPT

## 2021-06-09 PROCEDURE — 83550 IRON BINDING TEST: CPT

## 2021-06-09 PROCEDURE — 80053 COMPREHEN METABOLIC PANEL: CPT

## 2021-06-09 PROCEDURE — 85025 COMPLETE CBC W/AUTO DIFF WBC: CPT

## 2021-06-09 PROCEDURE — 99285 EMERGENCY DEPT VISIT HI MDM: CPT

## 2021-06-09 PROCEDURE — 97110 THERAPEUTIC EXERCISES: CPT

## 2021-06-09 PROCEDURE — 83690 ASSAY OF LIPASE: CPT

## 2021-06-09 PROCEDURE — 82728 ASSAY OF FERRITIN: CPT

## 2021-06-09 PROCEDURE — 74177 CT ABD & PELVIS W/CONTRAST: CPT

## 2021-06-09 PROCEDURE — 85730 THROMBOPLASTIN TIME PARTIAL: CPT

## 2021-06-09 PROCEDURE — 82105 ALPHA-FETOPROTEIN SERUM: CPT

## 2021-06-09 PROCEDURE — 97162 PT EVAL MOD COMPLEX 30 MIN: CPT

## 2021-06-09 PROCEDURE — 71045 X-RAY EXAM CHEST 1 VIEW: CPT

## 2021-06-09 PROCEDURE — 83036 HEMOGLOBIN GLYCOSYLATED A1C: CPT

## 2021-06-09 PROCEDURE — 83735 ASSAY OF MAGNESIUM: CPT

## 2021-06-09 PROCEDURE — 86301 IMMUNOASSAY TUMOR CA 19-9: CPT

## 2021-06-09 PROCEDURE — 80061 LIPID PANEL: CPT

## 2021-06-09 RX ORDER — HYDROXYZINE HCL 10 MG
25 TABLET ORAL EVERY 8 HOURS
Refills: 0 | Status: DISCONTINUED | OUTPATIENT
Start: 2021-06-09 | End: 2021-06-09

## 2021-06-09 RX ADMIN — HEPARIN SODIUM 5000 UNIT(S): 5000 INJECTION INTRAVENOUS; SUBCUTANEOUS at 13:10

## 2021-06-09 RX ADMIN — Medication 25 MILLIGRAM(S): at 13:10

## 2021-06-09 RX ADMIN — Medication 25 MILLIGRAM(S): at 08:25

## 2021-06-09 RX ADMIN — HEPARIN SODIUM 5000 UNIT(S): 5000 INJECTION INTRAVENOUS; SUBCUTANEOUS at 05:19

## 2021-06-09 NOTE — PROGRESS NOTE ADULT - NEGATIVE SKIN SYMPTOMS
no rash/no itching/no dryness

## 2021-06-09 NOTE — PROGRESS NOTE ADULT - PROBLEM SELECTOR PLAN 2
noted to have pancreatic head mass  plan as above  f/u GI and surgical oncology recs
noted to have pancreatic head mass  plan as above

## 2021-06-09 NOTE — PROGRESS NOTE ADULT - SUBJECTIVE AND OBJECTIVE BOX
PGY-1 Progress Note discussed with attending    PAGER #: [[phone]] TILL 5:00 PM  PLEASE CONTACT ON CALL TEAM:  - On Call Team (Please refer to Bryant) FROM 5:00 PM - 8:30PM  - Nightfloat Team FROM 8:30 -7:30 AM    CHIEF COMPLAINT & BRIEF HOSPITAL COURSE:    INTERVAL HPI/OVERNIGHT EVENTS:       REVIEW OF SYSTEMS:  CONSTITUTIONAL: No fever, weight loss, or fatigue  RESPIRATORY: No cough, wheezing, chills or hemoptysis; No shortness of breath  CARDIOVASCULAR: No chest pain, palpitations, dizziness, or leg swelling  GASTROINTESTINAL: No abdominal pain. No nausea, vomiting, or hematemesis; No diarrhea or constipation. No melena or hematochezia.  GENITOURINARY: No dysuria or hematuria, urinary frequency  NEUROLOGICAL: No headaches, memory loss, loss of strength, numbness, or tremors  SKIN: No itching, burning, rashes, or lesions     MEDICATIONS  (STANDING):  heparin   Injectable 5000 Unit(s) SubCutaneous every 8 hours    MEDICATIONS  (PRN):  hydrOXYzine hydrochloride 25 milliGRAM(s) Oral every 8 hours PRN Itching  ondansetron Injectable 4 milliGRAM(s) IV Push every 6 hours PRN Nausea and/or Vomiting      Vital Signs Last 24 Hrs  T(C): 36.4 (09 Jun 2021 05:17), Max: 36.8 (08 Jun 2021 19:20)  T(F): 97.5 (09 Jun 2021 05:17), Max: 98.3 (08 Jun 2021 19:20)  HR: 73 (09 Jun 2021 05:17) (62 - 73)  BP: 114/63 (09 Jun 2021 05:17) (114/63 - 120/75)  BP(mean): --  RR: 17 (09 Jun 2021 05:17) (16 - 18)  SpO2: 97% (09 Jun 2021 05:17) (96% - 98%)    PHYSICAL EXAMINATION:  GENERAL: NAD, well built  HEAD:  Atraumatic, Normocephalic  EYES:  conjunctiva and sclera clear  NECK: Supple, No JVD, Normal thyroid  CHEST/LUNG: Clear to auscultation. Clear to percussion bilaterally; No rales, rhonchi, wheezing, or rubs  HEART: Regular rate and rhythm; No murmurs, rubs, or gallops  ABDOMEN: Soft, Nontender, Nondistended; Bowel sounds present  NERVOUS SYSTEM:  Alert & Oriented X3,    EXTREMITIES:  2+ Peripheral Pulses, No clubbing, cyanosis, or edema  SKIN: warm dry                          CAPILLARY BLOOD GLUCOSE  CAPILLARY BLOOD GLUCOSE        CAPILLARY BLOOD GLUCOSE          RADIOLOGY & ADDITIONAL TESTS:                   PGY-1 Progress Note discussed with attending    PAGER #: [[phone]] TILL 5:00 PM  PLEASE CONTACT ON CALL TEAM:  - On Call Team (Please refer to Bryant) FROM 5:00 PM - 8:30PM  - Nightfloat Team FROM 8:30 -7:30 AM        INTERVAL HPI/OVERNIGHT EVENTS:   patient examined bedside, c/o itching , pt is for home hospice vs [facility]     REVIEW OF SYSTEMS:  CONSTITUTIONAL: No fever, weight loss, or fatigue  RESPIRATORY: No cough, wheezing, chills or hemoptysis; No shortness of breath  CARDIOVASCULAR: No chest pain, palpitations, dizziness, or leg swelling  GASTROINTESTINAL: No abdominal pain. No nausea, vomiting, or hematemesis; No diarrhea or constipation. No melena or hematochezia.  GENITOURINARY: No dysuria or hematuria, urinary frequency  NEUROLOGICAL: No headaches, memory loss, loss of strength, numbness, or tremors  SKIN: + itching, burning, rashes, or lesions     MEDICATIONS  (STANDING):  heparin   Injectable 5000 Unit(s) SubCutaneous every 8 hours    MEDICATIONS  (PRN):  hydrOXYzine hydrochloride 25 milliGRAM(s) Oral every 8 hours PRN Itching  ondansetron Injectable 4 milliGRAM(s) IV Push every 6 hours PRN Nausea and/or Vomiting      Vital Signs Last 24 Hrs  T(C): 36.4 (09 Jun 2021 05:17), Max: 36.8 (08 Jun 2021 19:20)  T(F): 97.5 (09 Jun 2021 05:17), Max: 98.3 (08 Jun 2021 19:20)  HR: 73 (09 Jun 2021 05:17) (62 - 73)  BP: 114/63 (09 Jun 2021 05:17) (114/63 - 120/75)  BP(mean): --  RR: 17 (09 Jun 2021 05:17) (16 - 18)  SpO2: 97% (09 Jun 2021 05:17) (96% - 98%)    PHYSICAL EXAMINATION:  GENERAL: NAD, well built  HEAD:  Atraumatic, Normocephalic  EYES:  conjunctiva and sclera clear  NECK: Supple, No JVD, Normal thyroid  CHEST/LUNG: Clear to auscultation. Clear to percussion bilaterally; No rales, rhonchi, wheezing, or rubs  HEART: Regular rate and rhythm; No murmurs, rubs, or gallops  ABDOMEN: Soft, Nontender, Nondistended; Bowel sounds present  NERVOUS SYSTEM:  Alert & Oriented X3,    EXTREMITIES:  2+ Peripheral Pulses, No clubbing, cyanosis, or edema  SKIN: warm dry                          CAPILLARY BLOOD GLUCOSE  CAPILLARY BLOOD GLUCOSE        CAPILLARY BLOOD GLUCOSE          RADIOLOGY & ADDITIONAL TESTS:

## 2021-06-09 NOTE — PROGRESS NOTE ADULT - NEGATIVE GENERAL SYMPTOMS
no fever/no chills/no sweating/no polyphagia/no polyuria/no polydipsia/no fatigue

## 2021-06-09 NOTE — PROGRESS NOTE ADULT - NEGATIVE CARDIOVASCULAR SYMPTOMS
no chest pain/no palpitations/no orthopnea

## 2021-06-09 NOTE — PROGRESS NOTE ADULT - NEGATIVE PSYCHIATRIC SYMPTOMS
no suicidal ideation/no depression/no anxiety/no insomnia/no memory loss/no paranoia/no mood swings/no agitation

## 2021-06-09 NOTE — PROGRESS NOTE ADULT - MOUTH
normal mouth and gums/moist

## 2021-06-09 NOTE — PROGRESS NOTE ADULT - NSICDXPILOT_GEN_ALL_CORE
[place]

## 2021-06-09 NOTE — PROGRESS NOTE ADULT - NEGATIVE GENERAL GENITOURINARY SYMPTOMS
no hematuria/no renal colic/no flank pain L/no flank pain R/no dysuria

## 2021-06-09 NOTE — PROGRESS NOTE ADULT - NEGATIVE RESPIRATORY AND THORAX SYMPTOMS
no wheezing/no dyspnea/no cough/no hemoptysis

## 2021-06-09 NOTE — PROGRESS NOTE ADULT - PROVIDER SPECIALTY LIST ADULT
Hepatology
Internal Medicine
Surgery
Gastroenterology
Internal Medicine
Gastroenterology
Internal Medicine
Gastroenterology
Gastroenterology

## 2021-06-09 NOTE — PROGRESS NOTE ADULT - SUBJECTIVE AND OBJECTIVE BOX
[   ] ICU                                          [   ] CCU                                      [ X  ] Medical Floor    Patient is a 89 year old female with Jaundice. GI consulted to evaluate.         Patient is a 89 year old female from home AAO x3, with past medical history of Left eye blindness, who presented to the ED due to jaundice and weakness. Patient states that she noticed her skin and eyes were yellow about 3 days ago. Patient states that prior to that her skin and eyes were fine. Patient also states having some weakness for the past few days as well. Patient denies any history of any medical issues other than some left eye blindness. Denies any pain, nausea, vomiting, dizziness or headache. Spoke to daughter, Genny ([phone]) for further history. Daughter states that the patient lives alone at home but the daughter lives near by. Patient has a HHA 3days/week. Daughter states she had noticed patient's skin was yellow on Sunday.  No abdominal pain, nausea, vomiting, hematemesis, hematochezia, melena, fever, chills, chest pain, SOB, cough, hematuria or dysuria reported.     Today patient appears comfortable but lethargic. No new complaints reported, No abdominal pain, N/V, hematemesis, hematochezia, melena, fever, chills, chest pain, SOB, cough or diarrhea reported.      PAIN MANAGEMENT:  Pain Scale:                 0/10  Pain Location:      Prior Colonoscopy:  No prior colonoscopy    PAST MEDICAL HISTORY  Blind       PAST SURGICAL HISTORY  No significant past surgical history        Allergies    No Known Allergies    Intolerances    None       MEDICATIONS  (STANDING):  heparin   Injectable 5000 Unit(s) SubCutaneous every 8 hours    MEDICATIONS  (PRN):  ondansetron Injectable 4 milliGRAM(s) IV Push every 6 hours PRN Nausea and/or Vomiting      SOCIAL HISTORY  Advanced Directives:       [  ] Full Code       [ X ] DNR  Marital Status:         [  ] M      [ X ] S      [  ] D       [  ] W  Children:       [ X ] Yes      [  ] No  Occupation:        [  ] Employed       [ X ] Unemployed       [  ] Retired  Diet:       [ X ] Regular       [  ] PEG feeding          [  ] NG tube feeding  Drug Use:           [ X ] Patient denied          [  ] Yes  Alcohol:           [X  ] No             [  ] Yes (socially)         [  ] Yes (chronic)  Tobacco:           [  ] Yes           [ X ] No      FAMILY HISTORY  [X  ] Heart Disease            [ X ] Diabetes             [ X ] HTN             [  ] Colon Cancer             [  ] Stomach Cancer              [  ] Pancreatic Cancer      VITALS  Vital Signs Last 24 Hrs  T(C): 36.7 (09 Jun 2021 13:00), Max: 36.8 (08 Jun 2021 19:20)  T(F): 98.1 (09 Jun 2021 13:00), Max: 98.3 (08 Jun 2021 19:20)  HR: 63 (09 Jun 2021 13:00) (63 - 73)  BP: 131/58 (09 Jun 2021 13:00) (114/63 - 131/58)  BP(mean): --  RR: 17 (09 Jun 2021 13:00) (17 - 18)  SpO2: 99% (09 Jun 2021 13:00) (96% - 99%)       MEDICATIONS  (STANDING):  heparin   Injectable 5000 Unit(s) SubCutaneous every 8 hours  hydrOXYzine hydrochloride 25 milliGRAM(s) Oral every 8 hours    MEDICATIONS  (PRN):  ondansetron Injectable 4 milliGRAM(s) IV Push every 6 hours PRN Nausea and/or Vomiting

## 2021-06-09 NOTE — PROGRESS NOTE ADULT - RS GEN PE MLT RESP DETAILS PC
airway patent/breath sounds equal/good air movement/respirations non-labored/no rales/no rhonchi
airway patent/breath sounds equal/good air movement/respirations non-labored/no rales/no rhonchi/no wheezes
airway patent/breath sounds equal/good air movement/no rhonchi
airway patent/breath sounds equal/good air movement/respirations non-labored/no rales/no rhonchi

## 2021-06-09 NOTE — PROGRESS NOTE ADULT - GASTROINTESTINAL DETAILS
soft/nontender/no distention/no masses palpable/bowel sounds normal/no bruit/no rebound tenderness/no guarding/no rigidity
soft/nontender
soft/nontender/no distention/no masses palpable/bowel sounds normal/no bruit/no rebound tenderness/no guarding/no rigidity
soft/nontender/no distention/no masses palpable/bowel sounds normal/no bruit/no rebound tenderness/no guarding/no rigidity

## 2021-06-09 NOTE — PROGRESS NOTE ADULT - SUBJECTIVE AND OBJECTIVE BOX
Patient is a 89y old  Female who presents with a chief complaint of jaundice (08 Jun 2021 08:43)    pt seen in icu [  ], reg med floor [ x  ], bed [ x ], chair at bedside [   ], a+o x3 [ x ], lethargic [  ],    nad [x  ]      Allergies    No Known Allergies        Vitals    T(F): 97.5 (06-09-21 @ 05:17), Max: 98.3 (06-08-21 @ 19:20)  HR: 73 (06-09-21 @ 05:17) (62 - 73)  BP: 114/63 (06-09-21 @ 05:17) (114/63 - 120/75)  RR: 17 (06-09-21 @ 05:17) (16 - 18)  SpO2: 97% (06-09-21 @ 05:17) (96% - 98%)  Wt(kg): --  CAPILLARY BLOOD GLUCOSE          Labs                          Radiology Results      Meds    MEDICATIONS  (STANDING):  heparin   Injectable 5000 Unit(s) SubCutaneous every 8 hours      MEDICATIONS  (PRN):  hydrOXYzine hydrochloride 25 milliGRAM(s) Oral every 8 hours PRN Itching  ondansetron Injectable 4 milliGRAM(s) IV Push every 6 hours PRN Nausea and/or Vomiting      Physical Exam    Neuro :  no focal deficits  Respiratory: CTA B/L  CV: RRR, S1S2, no murmurs,   Abdominal: Soft, NT, ND +BS,  Extremities: No edema, + peripheral pulses    ASSESSMENT    painless jaundice 2nd to pancreatic head mass   liver failure,   anemia      PLAN      ct abd pelv with Infiltrating relatively hyperenhancing mass in the pancreatic head, extending to the pancreatic duodenal groove, with resulting severe intra and extrahepatic biliary dilatation. Prominently enhancing peripancreatic lymph node. Differential considerations include cholangiocarcinoma and primary neuroendocrine tumor. Pancreatic adenocarcinoma is considered less likely noted   hepatology cons noted  gi cons noted.  as per pt and family they dont want any agressive measures  palliative cons noted  MOLST drafted; DNR/DNI/no feeding tube/comfort measures only.  Pt deferred to her family for any further discussions.    Family would like pt to be discharged to [facility] given limitation of Medicare, whilst they work on transitioning insurance coverage to Medicaid.    Plan is to then have pt transferred home with hospice.  phys tx eval noted and rec phys tx 3-5x/week x 4-6 weeks at home w/ assist   cont atarax 25mg q8 prn itching  cont current meds   d/c planning for possible home hospice     Patient is a 89y old  Female who presents with a chief complaint of jaundice (08 Jun 2021 08:43)    pt seen in icu [  ], reg med floor [ x  ], bed [ x ], chair at bedside [   ], a+o x3 [ x ], lethargic [  ],    nad [x  ]      Allergies    No Known Allergies        Vitals    T(F): 97.5 (06-09-21 @ 05:17), Max: 98.3 (06-08-21 @ 19:20)  HR: 73 (06-09-21 @ 05:17) (62 - 73)  BP: 114/63 (06-09-21 @ 05:17) (114/63 - 120/75)  RR: 17 (06-09-21 @ 05:17) (16 - 18)  SpO2: 97% (06-09-21 @ 05:17) (96% - 98%)  Wt(kg): --  CAPILLARY BLOOD GLUCOSE          Labs                          Radiology Results      Meds    MEDICATIONS  (STANDING):  heparin   Injectable 5000 Unit(s) SubCutaneous every 8 hours      MEDICATIONS  (PRN):  hydrOXYzine hydrochloride 25 milliGRAM(s) Oral every 8 hours PRN Itching  ondansetron Injectable 4 milliGRAM(s) IV Push every 6 hours PRN Nausea and/or Vomiting      Physical Exam    Neuro :  no focal deficits  Respiratory: CTA B/L  CV: RRR, S1S2, no murmurs,   Abdominal: Soft, NT, ND +BS,  Extremities: No edema, + peripheral pulses    ASSESSMENT    painless jaundice 2nd to pancreatic head mass  liver failure,   anemia      PLAN      ct abd pelv with Infiltrating relatively hyperenhancing mass in the pancreatic head, extending to the pancreatic duodenal groove, with resulting severe intra and extrahepatic biliary dilatation. Prominently enhancing peripancreatic lymph node. Differential considerations include cholangiocarcinoma and primary neuroendocrine tumor. Pancreatic adenocarcinoma is considered less likely noted   hepatology cons noted  gi cons noted.  as per pt and family they dont want any agressive measures  palliative cons noted  MOLST drafted; DNR/DNI/no feeding tube/comfort measures only.  Pt deferred to her family for any further discussions.    SWer spoke with daughter Genny via phone    Questions answered re: home hospice, Medicaid, and Medicare.    Daughter confirms that she would like pt. to d/c to [facility] and then home with home hospice.  phys tx eval noted and rec phys tx 3-5x/week x 4-6 weeks at home w/ assist   cont atarax 25mg q8 prn itching  cont current meds   d/c planning

## 2021-06-09 NOTE — PROGRESS NOTE ADULT - NEGATIVE HEMATOLOGY SYMPTOMS
no gum bleeding/no nose bleeding/no skin lumps

## 2021-06-09 NOTE — PROGRESS NOTE ADULT - PROBLEM SELECTOR PLAN 5
heparin for DVT prophylaxis

## 2021-06-09 NOTE — PROGRESS NOTE ADULT - PROBLEM SELECTOR PLAN 1
patient presented due to jaundice of skin and eyes  noted to have elevated LFTs on admission   CT abdomen showing infiltrating relatively hyperenhancing mass in the pancreatic head, extending to the pancreatic duodenal groove, with resulting severe intra and extrahepatic biliary dilatation. Prominently enhancing peripancreatic lymph node  GI, Dr. Garcia consulted  hepatology, Dr. Nuñez consulted   surgical oncology, Dr. Marrero consulted   surgery recommending ERCP.   palliative discussed with the family and patient is for home hospice,  atarax for itching, patient presented due to jaundice of skin and eyes  noted to have elevated LFTs on admission   CT abdomen showing infiltrating relatively hyperenhancing mass in the pancreatic head, extending to the pancreatic duodenal groove, with resulting severe intra and extrahepatic biliary dilatation. Prominently enhancing peripancreatic lymph node  GI, Dr. Garcia consulted  hepatology, Dr. Nuñez consulted   surgical oncology, Dr. Marrero consulted   surgery recommending ERCP.   palliative discussed with the family and patient is for home hospice,Vs JESUS ALBERTO  atarax for itching,

## 2021-06-09 NOTE — DISCHARGE NOTE NURSING/CASE MANAGEMENT/SOCIAL WORK - PATIENT PORTAL LINK FT
You can access the FollowMyHealth Patient Portal offered by [facility] by registering at the following website: [email or web address]. By joining [facility]’s FollowMyHealth portal, you will also be able to view your health information using other applications (apps) compatible with our system.

## 2021-06-09 NOTE — PROGRESS NOTE ADULT - [facility] HE PE MLT D E PC
regular rate and rhythm/no rub

## 2021-06-09 NOTE — PROGRESS NOTE ADULT - ITE SK HX ROS MEA POS PC
brittle nails/pitted nails/hair loss

## 2021-06-09 NOTE — PROGRESS NOTE ADULT - PROBLEM SELECTOR PLAN 3
elevated LFTs with AST/ALT of 175/220  likely secondary to mass noted on CT abdomen    palliative discussed with the family and patient is for home hospice,
elevated LFTs with AST/ALT of 175/220  likely secondary to mass noted on CT abdomen   monitor CMP daily   hepatology, Dr. Nuñez consulted

## 2022-06-16 NOTE — PROGRESS NOTE ADULT - PROBLEM SELECTOR PROBLEM 3
----- Message from [employer] sent at 6/16/2022  3:48 PM EDT -----  Subject: Message to Provider    QUESTIONS  Information for Provider? Pt needs a call back regarding yeast infection.   ---------------------------------------------------------------------------  --------------  CALL BACK INFO  What is the best way for the office to contact you? OK to leave message on   voicemail  Preferred Call Back Phone Number? [phone]  ---------------------------------------------------------------------------  --------------  SCRIPT ANSWERS  Relationship to Patient?  Self
Please call patient and see what she needs.
She has an appointment next week. Will keep that.
Facial fracture
Distal radius fracture, right
Facial fracture

## 2022-06-29 NOTE — PROGRESS NOTE ADULT - REASON FOR ADMISSION
jaundice
axillary crutches
jaundice

## 2023-11-07 NOTE — ED ADULT NURSE NOTE - NEURO BEHAVIOR
Thu Kan is a 18 year old [id number] at [id number](s) by LMP with an ANNI of 23 who is here for routine obstetric visit.    Pregnancy significant for teen pregnancy, late to care, trichomoniasis (SAMUEL neg), obesity, anxiety and asthma.  Father of baby is not speaking to her but she thinks he will be involved.  Presents with her mother.  Reports  Feeling well. Using inhaler maybe once a week. Reports she has not been to school in weeks due to anxiety. Interested in both medication and therapy.    Denies leaking of fluid, vaginal bleeding, uterine contractions, headache, vision changes,right upper quadrant pain, dysuria, fever, or rash.  Reports + FM (fetal movement).    Visit Vitals  LMP 2023     GENERAL:  Alert and oriented.  No acute distress.  FHTs: 140  Fundal height: 33      LABS    OB1 - PNL:  A+, ABS neg, RI, VZV immune, Hgb/Hct 10.7/33.5 Plt 122, Hep B surface Ag neg, Hep C antibody neg, HIV Neg, GC/CT neg, Urine culture neg, Hgb EP neg    Genetic/1TM/MSAFP not completed    3TM Hgb/Hct 10.3  ABS -; 1hr gtt 101      ULTRASOUNDS     - 23 @ 28w6d GA; Anatomy Ultrasound: normal survey MVP:  5.1  placenta:  Anterior no previa; marginal cord inserion AC:  67th%tile  EFW:  1407 gm. 3 lb 2 oz 59 %Tile    ASSESSMENT/PLAN:  This is a 18 year old [id number] at [id number](s) by LMP with an ANNI (estimated date of delivery) 23 who is here for routine obstetric visit.    Supervision of normal pregnancy:  - Dates by lmp  - Continue prenatal vitamin  - Infant:  Girl, Miracle  - Feeding: breast   - Postpartum Birth Control:  Likely OCPs  - Pediatrician:  Dr. Bland  - Labor:  Would like to avoid needles  - Tdap:  2023  - Flu: 23  - COVID19: 2023  - GBS:positive    Obesity in pregnancy  - BMI at first OB 37  - Growth scheduled 10/10, then [id number]  -  testing at 37wks     Asthma  - stable  - reports inhaler use 1xweekly    Anxiety  - referral placed for behavioral  health  - prescription sent for Escitalopram 5mg x 6days then 10mg daily    Trichomoniasis in preg  - SAMUEL neg 2023    RTC (Return to clinic) in 2 weeks or sooner if needed.  Reviewed OB warning signs, including s/sx (signs/symptoms) of preeclampsia and  labor.  Discussed fetal movement counting.  Discussed when to call/present to OB triage and 24 hour CNM number.    Mari Evans CNM       calm

## 2024-05-02 NOTE — ED PROVIDER NOTE - EYES, MLM
[Initial] : an initial visit [Chest Pain] : chest pain Clear bilaterally, pupils equal, round and reactive to light.

## 2025-06-18 NOTE — ED ADULT TRIAGE NOTE - NS ED NURSE DIRECT TO ROOM YN
[place] [facility] ENCOUNTER  PULMONARY CONSULT NOTE    REQUESTING PROVIDER:  Gisele Soto MD     REASON FOR CONSULTATION: SOB, RA, s/p renal transplantation    VISIT DIAGNOSES:  1. QUAN (dyspnea on exertion)    2. Excessive daytime sleepiness    3. Acute deep vein thrombosis (DVT) of distal vein of right lower extremity  (CMD)         IMPRESSION:      Assessment & Plan  1. Shortness of breath.  -Lifetime non-smoker  -Progressive shortness of breath for 1 year  -Normal spirometry, TLC, mildly reduced DLCO in 2022  -CT scan from 04/2023 revealed small cysts throughout lungs, cystic changes appear stable compared to 2015  - Echocardiogram 4/25 did not indicate pulmonary hypertension.   - Walk in clinic does not show significant desaturation   - Chronic back pain slowing her down significantly over the last 2 years, significant component of deconditioning is suspected  - CT chest with ILD protocol, PFTs will be completed    2-Excessive daytime sleepiness-she does not report snoring.  - Overnight pulse oximetry will be performed    3. DVT  - Recently diagnosed with extensive DVT in right lower extremity, started on Eliquis as of 06/17/2025.  - Nuclear medicine ventilation/perfusion scan is low probability for PE  -Chronic, slowly progressive QUAN is not consistent with acute PE    4. Rheumatoid arthritis.  - Long-standing history primarily affecting joints, back, and mobility.  - Thin-walled lung cysts that appear stable since 2015. ? LIP  - CT chest with ILD protocol will be completed    5. Renal transplant.  - Kidney transplant in 2013, still functioning but showing signs of decline.  - On CellCept, prednisone 5 mg, and tacrolimus (Prograf) as part of immunosuppression regimen.  - CT chest with ILD protocol to investigate for possible drug toxicity          PLAN:  CT chest with ILD protocol  PFTs  Overnight pulse oximetry  Use albuterol more liberally  Discussed plan after testing    Orders Placed This Encounter    • CT CHEST ILD HIGH RESOLUTION WO CONTRAST   • SERVICE TO HOME CARE RESPIRATORY THERAPY   • PFT Complete PFT(Deondre w/BD,Diff Cap,Lung Vol)           CHIEF COMPLAINT:  Chief Complaint   Patient presents with   • New Patient       SUBJECTIVE:  Georgiana Andrew is a 79 year old female seen today for problems listed above.    History of Present Illness    She has been experiencing progressive dyspnea for over a year, which has recently worsened. She describes a sensation of inadequate air intake during deep breaths and reports fatigue and weakness after minimal exertion, such as walking short distances.  Intermittently, her dyspnea is present even at rest, lasting a few minutes, and is accompanied by occasional palpitations. She does not experience wheezing or chest tightness. Her walking distance is limited to 20 feet or less due to shortness of breath.     She uses a single pillow for sleep and does not experience nocturnal dyspnea.     She reports no symptoms of asthma, no history of smoking, and no exposure to secondhand smoke.     She occasionally experiences mucus in the back of her throat, which she attributes to postnasal drip rather than lung origin. She reports no runny nose, congestion, sinus pressure, or postnasal drip. She reports no seasonal upper airway symptoms and has no history of childhood asthma.     She reports no frequent colds and recovers quickly from them when they occur.     She has lived in her current home for 25 years without any issues with water damage or mold.  No occupational exposures.  No unusual hobbies.  She has cats and a dog , no symptoms around her pets.      She reports no loud snoring or poor sleep quality but does report daytime sleepiness and low energy levels. She sleeps for approximately 12 hours per night without interruptions.     She reports no fever, chills, night sweats, or appetite changes.     She was told she has COPD 2 years ago.  She has been on albuterol  intermittently since then. She uses the inhaler once daily, taking two puffs each time.  Albuterol helps.    Increase D-dimer yesterday, prompting an emergency hospital visit-ultrasound was positive for acute DVT, she was started on Eliquis. She reports no leg pain but does have bilateral pedal edema, more pronounced on the left side.     She reports no acute illness preceding her dyspnea and has no history of COVID-19 infection. She also reports ear congestion and fatigue, which she attributes to her breathing difficulties.     She experienced a severe sinus infection with significant drainage about 1.5 months ago, which resolved with antibiotics.     She has been less active over the last couple of years due to back pain and neuropathy, which causes balance issues and occasional falls.    She has a history of rheumatoid arthritis, diagnosed at age 30, which primarily affects her joints, back, and mobility. She believes the prednisone is helping her rheumatoid arthritis a little bit. She is not taking any injectable medicines for rheumatoid arthritis. She does not have a rheumatologist currently. She follows up with a pain doctor, who gives her injections in the spine.    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 The kidney is still working well. She is on CellCept, prednisone 5 mg, and Prograf for immunosuppression.    She has a history of heartburn and takes Pepcid for it, which she finds effective. She occasionally experiences difficulty swallowing solid food but manages this by drinking plenty of water with meals. She reports no aspiration into the lungs.    She has a history of back pain and spondylitis, which is degenerative and has been less able to exercise for a couple of years. She receives steroid injections into the spine but reports they are not working well anymore.    PAST SURGICAL  HISTORY:  Kidney transplant in 2013    SOCIAL HISTORY  Lifetime non-smoker          HISTORIES:    Past Medical History:   Diagnosis Date   • Anemia of chronic disease    • Asymptomatic varicose veins    • Chronic renal disease     s/p kidney transplant   • Depression    • Essential (primary) hypertension     benign   • Fatigue    • Gastroesophageal reflux disease    • Lymphedema    • Numbness and tingling of left upper and lower extremity    • Osteopenia    • RA (rheumatoid arthritis) (CMD)    • Urinary tract infection without hematuria, site unspecified 8/26/2022      Past Surgical History:   Procedure Laterality Date   • Arteriovenous anastomosis  12/11/2009   • Biopsy of breast, needle core Left 12/07/2009    benign   • Biopsy of thyroid,percut  03/31/2011   • Breast biopsy Right 2000    benign needle aspiration   • Breast cyst aspiration Right     benign   • Colonoscopy  02/25/2014   • Colonoscopy diagnostic  03/07/2006   • Dexa bone density axial skeleton  02/02/2007   • Echo heart resting w doppler & color flow  01/11/2022   • Esophagogastroduodenoscopy transoral flex w/bx single or mult  03/07/2006    EGD with Bx   • Extracorporeal shock wave lithotripsy  09/19/2003   • Eye surgery Bilateral 2009    cataract - IOL   • Hb cystoscopy     • Kidney transplant  02/2013   • Mini-laparotomy     • Oophorectomy  01/01/1979   • Pap smear,routine  03/09/2011   • Removal gallbladder  01/01/1981    Cholecystectomy (gallbladder)   • Renal biopsy  02/15/2008   • Right oophorectomy     • Stereotactic breast biopsy     • Tonsillectomy     • Tonsillectomy and adenoidectomy     •  renal transplant  2013    [place]      ALLERGIES:   Allergen Reactions   • Lexapro [Escitalopram] Other (See Comments)     Suicidal thoughts   • Allergy      Wool   • Bee Sting SWELLING   • Dapsone Dermatitis   • Dust      Dust mites   • Feldene [Piroxicam] HIVES     Skin breakdown - \"fingers peeling\"   • Hornet Venom SWELLING     BEE STINGS   •  Sulfa Antibiotics SWELLING     Lip swelling        Social History     Tobacco Use   • Smoking status: Never   • Smokeless tobacco: Never   • Tobacco comments:     Non-smoking household   Substance Use Topics   • Alcohol use: No      Family History   Problem Relation Age of Onset   • Heart disease Mother    • Hypertension Mother    • Hyperlipidemia Mother    • Diabetes Mother    • Gastrointestinal Mother    • Cancer Father    • COPD Father    • Heart disease Father    • Hypertension Father    • Cancer Identical Twin 77        bladder cancer age 77           Current Outpatient Medications   Medication Sig Dispense Refill   • Apixaban Starter Pack 5 MG Tablet Therapy Pack Take 2 tablets by mouth twice a day for 7 days, then 1 tablet by mouth twice a day. 74 each 0   • nystatin (MYCOSTATIN) 906285 UNIT/ML suspension Swish and swallow 5 mLs 4 times daily. For 7 to 14 days. 1 each 1   • gabapentin (NEURONTIN) 100 MG capsule TAKE 1 CAPSULE BY MOUTH IN THE MORNING AND IN THE EVENING 180 capsule 0   • amoxicillin (AMOXIL) 500 MG capsule Take 2000 mg (4 caps) one hour before your injection time with Dr. Radford. (Supply is enough for 3 injections) 12 capsule 0   • albuterol 108 (90 Base) MCG/ACT inhaler INHALE 2 PUFFS INTO THE LUNGS THREE TIMES DAILY AS NEEDED FOR SHORTNESS OF BREATH OR WHEEZING 18 g 3   • lisinopril-hydroCHLOROthiazide (ZESTORETIC) 10-12.5 MG per tablet TAKE 1 TABLET BY MOUTH DAILY 90 tablet 2   • venlafaxine (EFFEXOR) 37.5 MG tablet TAKE 1 TABLET BY MOUTH IN THE MORNING AND IN THE EVENING 180 tablet 2   • CARBOXYMethylcellulose PF (REFRESH CELLUVISC) 1 % Gel ophthalmic gel Place 1 drop into both eyes daily.     • famotidine (PEPCID) 20 MG tablet Take 1 tablet by mouth in the morning and 1 tablet in the evening. 180 tablet 1   • vitamin B-12 (CYANOCOBALAMIN) 1000 MCG tablet Take 1,000 mcg by mouth daily.     • mycophenolate (CELLCEPT) 250 MG capsule Take 2 capsules by mouth in the morning and 2 capsules in  the evening.     • Sodium Fluoride 5000 PPM 1.1 % dental paste      • Ferrous Sulfate (Iron) 325 (65 Fe) MG Tab Take 1 tablet by mouth every other day. Monday Wednesday friday     • Magnesium 100 MG Tab Take 100 mg by mouth every other day. Tuesday Thursday saturday     • predniSONE (DELTASONE) 5 MG tablet Take 5 mg by mouth daily. Taking 1 tablet daily     • amLODIPine (NORVASC) 5 MG tablet Take 7.5 mg by mouth daily. Indications: High Blood Pressure     • diclofenac (VOLTAREN) 1 % gel Apply topically 4 times daily as needed. Apply to the vein on right leg for discomfort.      • Cholecalciferol (VITAMIN D) 2000 UNITS tablet Take 2,000 Units by mouth daily.     • TACROlimus (PROGRAF) 1 MG capsule 2mg in the am and 2mg in pm 120 capsule 3   • aspirin 81 MG chewable tablet Chew 81 mg by mouth daily.     • acetaminophen (TYLENOL) 325 MG tablet Take 325 mg by mouth every 4 hours as needed.       Current Facility-Administered Medications   Medication Dose Route Frequency Provider Last Rate Last Admin   • cyanocobalamin injection 1,000 mcg  1,000 mcg Intramuscular Q30 Days iGsele Soto MD   1,000 mcg at 06/12/25 1315       REVIEW OF SYSTEMS:    All systems reviewed and are negative with the exception of the findings noted in the History of Present Illness.    OBJECTIVE:  Vital Signs: Visit Vitals  /76 (BP Location: LUE - Left upper extremity, Patient Position: Sitting, Cuff Size: Regular)   Pulse 86   Temp 98.1 °F (36.7 °C) (Temporal)   Resp 16   Wt 79.8 kg (176 lb)   LMP  (LMP Unknown)   SpO2 96%   BMI 28.41 kg/m²       PHYSICAL EXAM:  Constitutional: Well developed, well nourished, no acute distress, non-toxic appearance.  HEENT: Atraumatic, PERRL, conjunctivae normal, external ears normal, nose normal, oropharynx moist, no pharyngeal exudates. Neck- No adenopathy. No accessory muscle use. Trachea midline.  Respiratory: clear, including with forced exhale   Cardiovascular: Normal rate, normal rhythm, no  Yes murmurs, no gallops, no rubs.  Gastrointestinal:Deferred  Musculoskeletal:  No cyanosis, clubbing, edema, petechiae. No evidence of bilateral upper or lower extremity asymmetry/misalignment/tenderness.   Integument: Well hydrated, no rash, lesions or ulcerations. No obvious induration of the skin, subcutaneous nodules or tightening.  Neurologic: Alert and oriented x 3, normal affect.    LABORATORY DATA:  Reviewed previously and data summarized below.   2/23/22   FVC 3.53 (123)   FEV1 2.45 (112)   FEV1/FVC 69   Post FEV1 2.47 (113)   TLC 5.76(109)   RV 2.23(100)   DLCO 12.84(63)*   Six min walk    Weight (BMI) 178 lbs   *DL/VA is 62%    IMAGING STUDIES:  Chest x-ray 6/17/2025-viewed personally.  Unremarkable.    LE venous duplex ultrasound 6/17/2025  Extensive occlusive thrombosis involving right lower extremity deep venous system from the posterior tibialis and peroneal veins through the proximal femoral vein. The visualized right common femoral and deep femoral veins are patent.    CT chest 4/4/23-viewed personally.  Multiple, thin-walled cysts throughout lungs.  Pleural thickening and associated nodularities in subpleural lung and left lower lobe.  The latter is less conspicuous compared to prior exams.    CT chest 8/16/2022-viewed personally.  Similar to 4/23.    CT chest 2/15/2022-viewed personally.  Similar to 4/23.    CT chest 12/20/2015-viewed personally.  Multiple thin-walled cysts throughout lungs, overall, similar to the current exams.  Left base looks normal in this imaging.    ECHO 4/17/2025  * Normal left atrial chamber size.    * Normal right atrial chamber size.    * No evidence of shunt at atrial level.    * Structurally normal trileaflet aortic valve.    * No aortic valve regurgitation.    * No aortic valve stenosis.    * Structurally normal mitral valve.    * Trivial mitral valve regurgitation.    * No mitral valve stenosis.    * Trivial-mild tricuspid valve regurgitation.    * Mild pulmonary valve  regurgitation.    * Structurally normal pulmonic valve.    * Normal size aortic root and proximal ascending aorta.    * Normal inferior vena cava.    * No pericardial effusion.    * Left pleural effusion identified.    * Normal left ventricular chamber size and systolic function.    * LV Global longitudinal strain -22.0 %.    * Moderately increased left ventricular wall thickness.    * No left ventricular regional wall motion abnormalities.    * Grade I diastolic dysfunction of the left ventricle (impaired relaxation  pattern).    * Normal right ventricular size and systolic function.    * No evidence for ventricular septal defect by two dimensional and colorflow  Doppler imaging.          No follow-ups on file.    Instructions provided as documented in the After Visit Summary.    The patient indicated understanding of the diagnosis and agreed with the plan of care.      Kathe Valentin MD  [facility]   [facility] [id number]  Dept Phone: [phone]